# Patient Record
Sex: FEMALE | Race: WHITE | NOT HISPANIC OR LATINO | Employment: UNEMPLOYED | URBAN - METROPOLITAN AREA
[De-identification: names, ages, dates, MRNs, and addresses within clinical notes are randomized per-mention and may not be internally consistent; named-entity substitution may affect disease eponyms.]

---
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
## 2022-02-10 ENCOUNTER — OFFICE VISIT (OUTPATIENT)
Dept: URGENT CARE | Facility: URGENT CARE | Age: 22
End: 2022-02-10
Payer: COMMERCIAL

## 2022-02-10 VITALS
BODY MASS INDEX: 13.7 KG/M2 | HEART RATE: 87 BPM | TEMPERATURE: 97.6 F | WEIGHT: 86.2 LBS | SYSTOLIC BLOOD PRESSURE: 102 MMHG | OXYGEN SATURATION: 99 % | DIASTOLIC BLOOD PRESSURE: 71 MMHG

## 2022-02-10 DIAGNOSIS — S10.93XA CONTUSION OF FACE, SCALP AND NECK, INITIAL ENCOUNTER: Primary | ICD-10-CM

## 2022-02-10 DIAGNOSIS — S09.90XA CLOSED HEAD INJURY, INITIAL ENCOUNTER: ICD-10-CM

## 2022-02-10 DIAGNOSIS — S00.83XA CONTUSION OF FACE, SCALP AND NECK, INITIAL ENCOUNTER: Primary | ICD-10-CM

## 2022-02-10 DIAGNOSIS — S00.03XA CONTUSION OF FACE, SCALP AND NECK, INITIAL ENCOUNTER: Primary | ICD-10-CM

## 2022-02-10 PROCEDURE — 99213 OFFICE O/P EST LOW 20 MIN: CPT | Performed by: NURSE PRACTITIONER

## 2022-02-10 ASSESSMENT — ENCOUNTER SYMPTOMS
CARDIOVASCULAR NEGATIVE: 1
FALLS: 1
BACK PAIN: 0
EYES NEGATIVE: 1
RESPIRATORY NEGATIVE: 1
NAUSEA: 0
HEADACHES: 1
VOMITING: 0
CONSTITUTIONAL NEGATIVE: 1
NECK PAIN: 0

## 2022-02-10 NOTE — PROGRESS NOTES
HPI  Patient is a 22-year-old female who presents to the urgent care after falling in her driveway.  She reports she was running out of her car to pick something up and she slipped on the ice.  She fell striking the left side of her face on the concrete.  She reports a small superficial laceration on the left side of the chin just inferior to her lip as well as bruising on the face.  She is unsure if she had a loss of consciousness but states she was quite dazed after the accident.  She denies any nausea, blurred vision, or vomiting.  She is able to ambulate into the exam room on her own and is accompanied by her mother.    Review of Systems   Constitutional: Negative.    HENT:        Facial contusion, negative dental injury   Eyes: Negative.    Respiratory: Negative.    Cardiovascular: Negative.    Gastrointestinal: Negative for nausea and vomiting.   Musculoskeletal: Positive for falls. Negative for back pain and neck pain.   Skin:        Superficial lac chin   Neurological: Positive for headaches.   Endo/Heme/Allergies: Negative.      No past medical history on file.  Patient Active Problem List   Diagnosis     Pectus excavatum     Underweight     Abdominal pain, chronic, generalized     Major depressive disorder, single episode, moderate (H)     Problem with child being bullied, initial encounter    No past surgical history on file.  Allergies   Allergen Reactions     Amoxicillin Shortness Of Breath and Rash     Current Outpatient Medications   Medication     Multiple Vitamin (MULTIVITAMIN OR)     norethindrone-ethinyl estradiol (JUNEL FE 1/20) 1-20 MG-MCG tablet     No current facility-administered medications for this visit.         Physical Exam  Vitals and nursing note reviewed.   Constitutional:       General: She is not in acute distress.     Comments: /71   Pulse 87   Temp 97.6  F (36.4  C)   Wt 39.1 kg (86 lb 3.2 oz)   SpO2 99%   BMI 13.70 kg/m       HENT:      Head:        Mouth/Throat:       Mouth: Mucous membranes are moist. No injury or oral lesions.      Dentition: Normal dentition.      Comments: Negative for any dental injury she does have a slight bit of bruising on the inner on the inside of the lower lip though the laceration does not extend through,  Cardiovascular:      Rate and Rhythm: Normal rate.   Pulmonary:      Effort: Pulmonary effort is normal.   Musculoskeletal:      Cervical back: Normal range of motion. No rigidity or tenderness.   Skin:     Capillary Refill: Capillary refill takes less than 2 seconds.   Neurological:      Mental Status: She is alert and oriented to person, place, and time.      Cranial Nerves: No cranial nerve deficit.      Sensory: Sensation is intact. No sensory deficit.      Motor: No weakness or pronator drift.      Coordination: Coordination normal. Finger-Nose-Finger Test normal.      Gait: Gait normal.   Psychiatric:         Mood and Affect: Mood normal.       Facial wound cleaned and single steri strip applied.    Assessment:  1. Contusion of face, scalp and neck, initial encounter    2. Closed head injury, initial encounter        Plan:  Icing bruised areas as directed  Tylenol 1-2 tabs po q4h prn pain  Mother given instructions on red flag and instructed that Kellee is not to be unsupervised for 24 hours including waking q 2 hours per instructions in AVS  Instructions regarding self-care of patient reviewed.   Written instructions provided in after visit summary and reviewed.  Patient instructed to see primary care provider for new or persistent symptoms.   Red flag symptoms reviewed and patient has been instructed to seek emergent care    Assessment:  1. Contusion of face, scalp and neck, initial encounter    2. Closed head injury, initial encounter        Plan:  Tylenol 1-2 tabs po q4h prn pain  Instructions regarding self-care of patient/child reviewed.   Written instructions provided in after visit summary and reviewed.  Patient instructed to see  primary care provider for new or persistent symptoms.   Red flag symptoms reviewed and patient has been instructed to seek emergent care    Michelle Rodríguez, DNP, APRN, CNP

## 2022-02-10 NOTE — PATIENT INSTRUCTIONS
Patient Education     Bruises (Contusions)    A bruise (contusion) happens when a blow to your body doesn't break the skin but does break blood vessels beneath the skin. Blood leaking from the broken vessels causes redness and swelling. As it heals, your bruise is likely to turn colors like purple, green, and yellow. This is normal. The bruise should fade in 2 or 3 weeks.   Things that make you more likely to bruise   Almost everyone bruises now and then. Certain people do bruise more easily than others. You're more prone to bruising as you get older. That's because blood vessels become more fragile with age. You're also more likely to bruise if you have a clotting disorder such as hemophilia or take medicines that reduce clotting, including aspirin, nonsteriodal anti-inflammatory (NSAIDs) and blood-thinning medicines. You are also more likely to bruise if you have liver disease or drink alcohol daily.   When to go to the emergency room (ER)  Bruises almost always heal on their own without special treatment. But for some people, a bad bruise can be serious. Seek medical care if you:     Have a clotting disorder such as hemophilia    Have cirrhosis or other serious liver disease    Take blood-thinning medicines such as heparin or warfarin  What to expect in the ER  A doctor will examine your bruise and ask about any health conditions you have. In some cases, you may have a test to check how well your blood clots. Other treatment will depend on your needs.   Follow-up care  Sometimes a bruise gets worse instead of better. It may become larger and more swollen. This can occur when your body walls off a small pool of blood under the skin (hematoma). In very rare cases, your doctor may need to drain extra blood from the area.   Tip:  Apply an ice pack or bag of frozen peas to a bruise for 15 to 20 minutes several times a day. Keep a thin cloth between the ice or frozen peas and your skin. The cold can help reduce  redness and swelling.   [employer] last reviewed this educational content on 10/1/2019    7322-1295 [employer]. All rights reserved. This information is not intended as a substitute for professional medical care. Always follow your healthcare professional's instructions.           Patient Education     Head Injury with Sleep Monitoring (Adult)    You have a head injury. It does not appear serious at this time. But symptoms of a more serious problem, such as mild brain injury (concussion), or bruising or bleeding in the brain, may appear later. For this reason, you and someone caring for you will need to watch for the symptoms listed below. Once at home, also be sure to follow any care instructions you re given.  Home care  Watch for the following symptoms  Someone must stay with you for the next 24 hours (or longer, if directed). If you fall asleep, this person should wake you up every 2 hours, or as directed, to check your symptoms. This is called sleep monitoring. Symptoms to watch for include:    Headache    Nausea or vomiting    Dizziness    Sensitivity to light or noise    Unusual sleepiness or grogginess    Trouble falling asleep    Personality changes    Vision changes    Memory loss    Confusion    Trouble walking or clumsiness    Loss of consciousness (even for a short time)    Inability to be awakened    Stiff neck    Weakness or numbness in any part of the body    Seizures    Bruising behind the ears or around the eyes  If you develop any of these symptoms, seek emergency medical care right away. If none of these symptoms are noted during the first 24 hours, keep watching for symptoms for the next day or so. Ask your provider if someone should stay with you during this time.   General care    If you were prescribed medicines for pain, use them as directed. Don t use other pain medicines without checking with your provider first.    To help reduce swelling and pain, apply a cold source to the  injured area for up to 20 minutes at a time. Do this as often as directed. Use a cold pack or bag of ice wrapped in a thin towel. Never apply a cold source directly to the skin.    If you have cuts or scrapes as a result of your injury, care for them as directed.    For the next 24 hours (or longer, if instructed):  ? Don t drink alcohol or use sedatives or other medicines that make you sleepy.  ? Don t drive or operate machinery.  ? Don t do anything strenuous, such as heavy lifting or straining.  ? Limit tasks that require concentration. This includes reading, using a smartphone or computer, watching TV, and playing video games.  ? Don t return to sports or other activity that could result in another head injury.  Follow-up care  Follow up with your healthcare provider, or as directed. If imaging tests were done, they will be reviewed by a doctor. You will be told the results and any new findings that may affect your care.  When to seek medical advice  Call your healthcare provider right away if any of these occur:    Pain doesn t get better or worsens    New or increased swelling or bruising    Fever of 100.4 F (38 C) or higher, or as directed by your provider    Redness, warmth, bleeding, or drainage from the injured area    Any depression or bony abnormality in the injured area    Fluid drainage or bleeding from the nose or ears    Bruising behind the ears or around the eyes    Lethargy or excessive sleepiness  [employer] last reviewed this educational content on 4/1/2018 2000-2021 [employer]. All rights reserved. This information is not intended as a substitute for professional medical care. Always follow your healthcare professional's instructions.

## 2022-06-06 ENCOUNTER — MYC MEDICAL ADVICE (OUTPATIENT)
Dept: FAMILY MEDICINE | Facility: CLINIC | Age: 22
End: 2022-06-06

## 2022-06-06 ENCOUNTER — VIRTUAL VISIT (OUTPATIENT)
Dept: FAMILY MEDICINE | Facility: CLINIC | Age: 22
End: 2022-06-06
Payer: COMMERCIAL

## 2022-06-06 DIAGNOSIS — R06.02 SHORTNESS OF BREATH: Primary | ICD-10-CM

## 2022-06-06 DIAGNOSIS — R13.10 DYSPHAGIA, UNSPECIFIED TYPE: ICD-10-CM

## 2022-06-06 DIAGNOSIS — F32.1 MAJOR DEPRESSIVE DISORDER, SINGLE EPISODE, MODERATE (H): ICD-10-CM

## 2022-06-06 DIAGNOSIS — Q67.6 PECTUS EXCAVATUM: ICD-10-CM

## 2022-06-06 PROCEDURE — 99214 OFFICE O/P EST MOD 30 MIN: CPT | Mod: GT | Performed by: FAMILY MEDICINE

## 2022-06-06 NOTE — PATIENT INSTRUCTIONS
At [facility], we strive to deliver an exceptional experience to you, every time we see you. If you receive a survey, please complete it as we do value your feedback.  If you have MyChart, you can expect to receive results automatically within 24 hours of their completion.  Your provider will send a note interpreting your results as well.   If you do not have MyChart, you should receive your results in about a week by mail.    Your care team:                            Family Medicine Internal Medicine   MD Dm Edwards MD Shantel Branch-Fleming, MD Srinivasa Vaka, MD Katya Belousova, MARISSA Siegel CNP, MD ([facility]   Eladio Betts, MD Sydnee Abbott MD Amelia Massimini APRN CNP Kim Thein, APRN CNP Bethany Templen, MD             Clinic hours: Monday - Thursday 7 am-6 pm; Fridays 7 am-5 pm.   Urgent care: Monday - Friday 10 am- 8 pm; Saturday and Sunday 9 am-5 pm.    Clinic: [phone]       [facility]: Monday - Thursday 8 am - 7 pm; Friday 8 am - 6 pm  [facility]: [phone]

## 2022-06-06 NOTE — TELEPHONE ENCOUNTER
See MyC message below, unclear of what form patient is referring to. PHQ-9 questionnaire?  Routing to provider and care team to review and clarify, thank you.      Rosy Wang RN  [facility]

## 2022-06-06 NOTE — PROGRESS NOTES
Kellee is a 22 year old who is being evaluated via a billable video visit.      How would you like to obtain your AVS? MyChart  If the video visit is dropped, the invitation should be resent by: Send to e-mail at: [email or web address]  Will anyone else be joining your video visit? No    Video Start Time: 9:35 AM    Assessment & Plan     Shortness of breath  Uncertain etiology but worry for nutrition deficiencies.  Check CT and EGD to evaluation Pectus Excavatum and swallow issues.   - CT Chest w/o Contrast; Future  - Adult Gastro Ref - Procedure Only; Future    Pectus excavatum  Identified on Chest xray, offered muscle relaxer to see if it would help with discomfort but patient declined.  - Adult Gastro Ref - Procedure Only; Future    Dysphagia, unspecified type  As above.  - Adult Gastro Ref - Procedure Only; Future    Major depression  Likely affecting self-image and maybe contributing to feeding issues.    Return in about 2 weeks (around 6/20/2022), or if symptoms worsen or fail to improve.    Kelsey Hunter MD  [facility]   Kellee is a 22 year old who presents for the following health issues     HPI     Concerns: Following up on Pectus Excavatum  Would like to discuss potential surgery. She feels like she can't eat or gain weight.  She feels like her food is getting stuck.  She has seen a nutritionist in the past.  She states that she also has discomfort and has popping and trouble lying down.      Review of Systems   Constitutional, HEENT, cardiovascular, pulmonary, gi and gu systems are negative, except as otherwise noted.      Objective           Vitals:  No vitals were obtained today due to virtual visit.    Physical Exam   GENERAL: Healthy, alert and no distress  EYES: Eyes grossly normal to inspection.  No discharge or erythema, or obvious scleral/conjunctival abnormalities.  RESP: No audible wheeze, cough, or visible cyanosis.  No visible  retractions or increased work of breathing.    SKIN: Visible skin clear. No significant rash, abnormal pigmentation or lesions.  NEURO: Cranial nerves grossly intact.  Mentation and speech appropriate for age.  PSYCH: mentation appears normal and affect flat          Video-Visit Details    Type of service:  Video Visit    Video End Time:9:55 AM    Originating Location (pt. Location): Home    Distant Location (provider location):  [facility]     Platform used for Video Visit: [employer]ner[employer]

## 2022-06-07 ENCOUNTER — ANCILLARY PROCEDURE (OUTPATIENT)
Dept: CT IMAGING | Facility: CLINIC | Age: 22
End: 2022-06-07
Attending: FAMILY MEDICINE
Payer: COMMERCIAL

## 2022-06-07 DIAGNOSIS — R06.02 SHORTNESS OF BREATH: ICD-10-CM

## 2022-06-07 PROCEDURE — 71250 CT THORAX DX C-: CPT | Mod: GC | Performed by: RADIOLOGY

## 2022-06-14 ENCOUNTER — TELEPHONE (OUTPATIENT)
Dept: GASTROENTEROLOGY | Facility: CLINIC | Age: 22
End: 2022-06-14
Payer: COMMERCIAL

## 2022-06-14 NOTE — TELEPHONE ENCOUNTER
Screening Questions  BlueKIND OF PREP RedLOCATION [review exclusion criteria] GreenSEDATION TYPE  1. Are you active on mychart? Y    2. Ordering/Referring Provider: Kelsey Giordano MD    3. What insurance is in the chart? [employer]     4. Do you have a legal guardian or medical Power of ?  Are you able to give consent for your medical care? N   (Sedation review/consideration needed)    3.   BMI 13.9 [BMI OVER 40-EXTENDED PREP]  If greater than 40 review exclusion criteria [PAC APPT IF @ [facility]]      4. Have you had a positive covid test in the last 90 days? N     5.  Respiratory Screening :  [If yes to any of the following HOSPITAL setting only]     Do you use daily home oxygen? N  Do you have mod to severe Obstructive Sleep Apnea? N [OKAY @ [facility] SH PH RI]   Do you have Pulmonary Hypertension? N   Do you have UNCONTROLLED asthma? N      6.   Have you had a heart or lung transplant? N      7.   Are you currently on dialysis? N [ If yes, G-PREP & HOSPITAL setting only]     8.   Do you have chronic kidney disease? N[ If yes, G-PREP ]    9.   Have you had a stroke or Transient ischemic attack (TIA - aka  mini stroke ) within 6 months?  N(If yes, please review exclusion criteria)    10.   In the past 6 months, have you had any heart related issues including cardiomyopathy or heart attack? N          If yes, did it require cardiac stenting or other implantable device? N      11.   Do you have any implantable devices in your body (pacemaker, defib, LVAD)? N (If yes, please review exclusion criteria)    12.   Do you take nitroglycerin? N           If yes, how often? N  (if yes, HOSPITAL setting ONLY)    13.   Are you currently taking any blood thinners? N           [IF YES, INFORM PATIENT TO FOLLOW UP W/ ORDERING PROVIDER FOR BRIDGING INSTRUCTIONS]     14.   Do you have a diagnosis of diabetes? N [ If yes, G-PREP ]    15.   [FEMALES] Are you currently pregnant? N    If yes, how many  weeks? N    16.   Are you taking any prescription pain medications on a routine schedule?  N [ If yes, EXTENDED PREP.] [If yes, MAC]    17.   Do you have any chemical dependencies such as alcohol, street drugs, or methadone?  N [If yes, MAC]    18.   Do you have any history of post-traumatic stress syndrome, severe anxiety or history of psychosis?  N [If yes, MAC]    19.   Do you transfer independently?  Y    20.  On a regular basis do you go 3-5 days between bowel movements? N [ If yes, EXTENDED PREP.]    21.   Preferred LOCAL Pharmacy for Pre Prescription      [id number] [place] [facility] [address] [facility] [address] [facility]  [address]      Scheduling Details      Caller :  Kellee   (Please ask for phone number if not scheduled by patient)    Type of Procedure Scheduled: Upper Endoscopy [EGD]  Which Colonoscopy Prep was Sent?:     Surgeon: FELIBERTO  Date of Procedure: 07/19 105PM  Location:     Sedation Type: CS    Conscious Sedation- Needs  for 6 hours after the procedure  MAC/General-Needs  for 24 hours after procedure    Pre-op Required at [facility], [place], [place] and OR for MAC sedation: N  (advise patient they will need a pre-op prior to procedure -)      Informed patient they will need an adult  Y  Cannot take any type of public or medical transportation alone    Pre-Procedure Covid test to be completed at [facility] or Externally: HOME    Confirmed Nurse will call to complete assessment Y    Additional comments:

## 2022-06-20 NOTE — RESULT ENCOUNTER NOTE
MsMyrna Turk,    I have placed a thoracic surgery referral.  You should get a call to schedule.    Please contact the clinic if you have additional questions.  Thank you.    Sincerely,    Kelsey Hunter MD

## 2022-06-22 ENCOUNTER — TELEPHONE (OUTPATIENT)
Dept: SURGERY | Facility: CLINIC | Age: 22
End: 2022-06-22

## 2022-06-22 NOTE — TELEPHONE ENCOUNTER
Called patient to let her know I have a message out to Dr. Wood. He sees patients that are 21 yrs of age and older for pectus excavatum. He is currently out this week but is return next week. Will call patient with Dr. Wood's recommendation. Left my call back information.

## 2022-06-26 ENCOUNTER — HEALTH MAINTENANCE LETTER (OUTPATIENT)
Age: 22
End: 2022-06-26

## 2022-07-01 DIAGNOSIS — Q67.7 PECTUS CARINATUM: Primary | ICD-10-CM

## 2022-07-01 DIAGNOSIS — Q67.6 PECTUS EXCAVATUM: ICD-10-CM

## 2022-07-12 NOTE — TELEPHONE ENCOUNTER
Action July 12, 2022 8:15 AM [facility]   Action Taken Called and obtained verbal auth to update CE records       RECORDS STATUS - ALL OTHER DIAGNOSIS      RECORDS RECEIVED FROM: EPIC   DATE RECEIVED: 07/12/22   NOTES STATUS DETAILS   OFFICE NOTE from referring provider EPIC 06/06/22: Dr. Kelsey Fitzpatrick [facility]   OPERATIVE REPORT EPIC 07/13/22: Echo Stress test   MEDICATION LIST [facility]    LABS     ANYTHING RELATED TO DIAGNOSIS Epic Most recent 04/15/21   IMAGING (NEED IMAGES & REPORT)     CT SCANS PACS 06/07/22: CT Chest   XRAYS PACS 02/21/19-09/23/11: XR Chest

## 2022-07-13 ENCOUNTER — HOSPITAL ENCOUNTER (OUTPATIENT)
Dept: CARDIOLOGY | Facility: CLINIC | Age: 22
Discharge: HOME | End: 2022-07-13
Attending: CLINICAL NURSE SPECIALIST
Payer: COMMERCIAL

## 2022-07-13 ENCOUNTER — OFFICE VISIT (OUTPATIENT)
Dept: SURGERY | Facility: CLINIC | Age: 22
End: 2022-07-13
Attending: FAMILY MEDICINE
Payer: COMMERCIAL

## 2022-07-13 ENCOUNTER — PRE VISIT (OUTPATIENT)
Dept: SURGERY | Facility: CLINIC | Age: 22
End: 2022-07-13

## 2022-07-13 ENCOUNTER — LAB (OUTPATIENT)
Dept: LAB | Facility: CLINIC | Age: 22
End: 2022-07-13

## 2022-07-13 VITALS
DIASTOLIC BLOOD PRESSURE: 67 MMHG | OXYGEN SATURATION: 97 % | WEIGHT: 88.3 LBS | SYSTOLIC BLOOD PRESSURE: 100 MMHG | TEMPERATURE: 98.4 F | BODY MASS INDEX: 14.19 KG/M2 | HEART RATE: 85 BPM | RESPIRATION RATE: 18 BRPM | HEIGHT: 66 IN

## 2022-07-13 DIAGNOSIS — Q67.6 PECTUS EXCAVATUM: ICD-10-CM

## 2022-07-13 DIAGNOSIS — Z11.59 NEED FOR HEPATITIS C SCREENING TEST: ICD-10-CM

## 2022-07-13 DIAGNOSIS — R06.02 SHORTNESS OF BREATH: ICD-10-CM

## 2022-07-13 DIAGNOSIS — Z11.4 SCREENING FOR HIV (HUMAN IMMUNODEFICIENCY VIRUS): ICD-10-CM

## 2022-07-13 LAB
ALBUMIN SERPL-MCNC: 4.2 G/DL (ref 3.4–5)
PREALB SERPL IA-MCNC: 25 MG/DL (ref 15–45)

## 2022-07-13 PROCEDURE — 93350 STRESS TTE ONLY: CPT | Mod: 26 | Performed by: STUDENT IN AN ORGANIZED HEALTH CARE EDUCATION/TRAINING PROGRAM

## 2022-07-13 PROCEDURE — 93016 CV STRESS TEST SUPVJ ONLY: CPT | Performed by: STUDENT IN AN ORGANIZED HEALTH CARE EDUCATION/TRAINING PROGRAM

## 2022-07-13 PROCEDURE — 99000 SPECIMEN HANDLING OFFICE-LAB: CPT | Performed by: PATHOLOGY

## 2022-07-13 PROCEDURE — 93321 DOPPLER ECHO F-UP/LMTD STD: CPT | Mod: 26 | Performed by: STUDENT IN AN ORGANIZED HEALTH CARE EDUCATION/TRAINING PROGRAM

## 2022-07-13 PROCEDURE — 87389 HIV-1 AG W/HIV-1&-2 AB AG IA: CPT | Mod: 90 | Performed by: PATHOLOGY

## 2022-07-13 PROCEDURE — G0463 HOSPITAL OUTPT CLINIC VISIT: HCPCS

## 2022-07-13 PROCEDURE — 36415 COLL VENOUS BLD VENIPUNCTURE: CPT | Performed by: PATHOLOGY

## 2022-07-13 PROCEDURE — 82040 ASSAY OF SERUM ALBUMIN: CPT | Performed by: PATHOLOGY

## 2022-07-13 PROCEDURE — 86803 HEPATITIS C AB TEST: CPT | Mod: 90 | Performed by: PATHOLOGY

## 2022-07-13 PROCEDURE — 84134 ASSAY OF PREALBUMIN: CPT | Performed by: PATHOLOGY

## 2022-07-13 PROCEDURE — G0463 HOSPITAL OUTPT CLINIC VISIT: HCPCS | Mod: 25

## 2022-07-13 PROCEDURE — 93018 CV STRESS TEST I&R ONLY: CPT | Performed by: STUDENT IN AN ORGANIZED HEALTH CARE EDUCATION/TRAINING PROGRAM

## 2022-07-13 PROCEDURE — 93325 DOPPLER ECHO COLOR FLOW MAPG: CPT | Mod: 26 | Performed by: STUDENT IN AN ORGANIZED HEALTH CARE EDUCATION/TRAINING PROGRAM

## 2022-07-13 PROCEDURE — 99203 OFFICE O/P NEW LOW 30 MIN: CPT | Performed by: THORACIC SURGERY (CARDIOTHORACIC VASCULAR SURGERY)

## 2022-07-13 PROCEDURE — 93325 DOPPLER ECHO COLOR FLOW MAPG: CPT | Mod: TC

## 2022-07-13 ASSESSMENT — PAIN SCALES - GENERAL: PAINLEVEL: NO PAIN (0)

## 2022-07-13 NOTE — LETTER
"    7/13/2022         RE: Kellee Turk  [id number] [address] [id number]        Dear Colleague,    Thank you for referring your patient, Kellee Turk, to the [facility]. Please see a copy of my visit note below.    THORACIC SURGERY - NEW PATIENT OFFICE VISIT      Dear Dr. Fitzpatrick,    I saw Kellee Turk in consultation for the evaluation and treatment of pectus excavatum.    HPI  Ms. Kellee Turk is a 22 year old female patient who presented with exercise intolerance and occasional chest pain. She likes swimming and horse back riding, and she has noticed that she can't keep up with her peers. She also has dyspnea and palpitations with physical activity. She was referred by her PCP for surgical consultation.                        Previsit Tests   Covid vaccination status: Vaccinated  CT scan (6/7/22): severe pectus excavatum deformity with Amado index of 7.1, right sternal torsion, and mild compression of the right atrium         PMH  None    PSH  None     Allergies   Allergen Reactions     Amoxicillin Shortness Of Breath and Rash       Current Outpatient Medications   Medication     Multiple Vitamin (MULTIVITAMIN OR)     No current facility-administered medications for this visit.       ETOH: None  TOBACCO: Occasional     Physical examination  /67 (BP Location: Right arm, Patient Position: Sitting, Cuff Size: Child)   Pulse 85   Temp 98.4  F (36.9  C) (Oral)   Resp 18   Ht 1.675 m (5' 5.95\")   Wt 40.1 kg (88 lb 4.8 oz)   LMP 06/22/2022 (Approximate)   SpO2 97%   BMI 14.28 kg/m    Alert and oriented NAD.   Bilateral breath sounds.   Chest: Downward deformity of the sternum, no tilt. Moderate costal flare. No chest scars.      From a personal perspective, she comes to clinic with her mom. Her name comes from [employer]. She is in school and is going to graduate soon from [employer]. She is also planning a trip to [place] in January. "     IMPRESSION   22 year old female patient with severe pectus excavatum.    PLAN  I spent 30 min on the date of the encounter in chart review, patient visit, review of tests, documentation and/or discussion with other providers about the issues documented above. I reviewed the plan as follows:  Procedure planned: Modified Ravitch repair with sternal plating. She would like to schedule surgery in late January.   Necessary Preop Tests & Appointments: Return to clinic in early January. H&P by PCP. Insurance approval.   Regional Anesthesia Plan: Intraoperative intercostal cryoablation.  Anticoagulation Plan: Enoxaparin postop.   I had an extensive discussion with the patient regarding the rationale for surgery, the alternatives risks and benefits of the procedure. I explained the expected hospital stay, postoperative course, recovery time, diet and activity restrictions. Informed consent was obtained and they agreed to proceed.  I appreciate the opportunity to participate in the care of your patient and will keep you updated.      Again, thank you for allowing me to participate in the care of your patient.      Sincerely,    Fernando Wood MD

## 2022-07-13 NOTE — NURSING NOTE
"Oncology Rooming Note    July 13, 2022 2:46 PM   Kellee Turk is a 22 year old female who presents for:    Chief Complaint   Patient presents with     Oncology Clinic Visit     New patient: Pectus excavatum, shortness of breath      Initial Vitals: /67 (BP Location: Right arm, Patient Position: Sitting, Cuff Size: Child)   Pulse 85   Temp 98.4  F (36.9  C) (Oral)   Resp 18   Ht 1.675 m (5' 5.95\")   Wt 40.1 kg (88 lb 4.8 oz)   LMP 06/22/2022 (Approximate)   SpO2 97%   BMI 14.28 kg/m   Estimated body mass index is 14.28 kg/m  as calculated from the following:    Height as of this encounter: 1.675 m (5' 5.95\").    Weight as of this encounter: 40.1 kg (88 lb 4.8 oz). Body surface area is 1.37 meters squared.  No Pain (0) Comment: Data Unavailable   Patient's last menstrual period was 06/22/2022 (approximate).  Allergies reviewed: Yes  Medications reviewed: Yes    Medications: Medication refills not needed today.  Pharmacy name entered into [email or web address]:    [id number] [place] [place] [facility] [address] [id number] [place], [place] - [address] AT [facility]  [address]    Clinical concerns: New patent-reports SOB with exertion.        Quin Parikh LPN July 13, 2022 2:46 PM              "

## 2022-07-14 LAB
HCV AB SERPL QL IA: NONREACTIVE
HIV 1+2 AB+HIV1 P24 AG SERPL QL IA: NONREACTIVE

## 2022-07-14 NOTE — RESULT ENCOUNTER NOTE
Ms. Turk,    Your hepatitis C and HIV test is normal.    Please contact the clinic if you have additional questions.  Thank you.    Sincerely,    Kelsey Hunter MD

## 2022-07-15 RX ORDER — NALOXONE HYDROCHLORIDE 0.4 MG/ML
0.4 INJECTION, SOLUTION INTRAMUSCULAR; INTRAVENOUS; SUBCUTANEOUS
Status: CANCELLED | OUTPATIENT
Start: 2022-07-15

## 2022-07-15 RX ORDER — FLUMAZENIL 0.1 MG/ML
0.2 INJECTION, SOLUTION INTRAVENOUS
Status: CANCELLED | OUTPATIENT
Start: 2022-07-15 | End: 2022-07-15

## 2022-07-15 RX ORDER — ONDANSETRON 2 MG/ML
4 INJECTION INTRAMUSCULAR; INTRAVENOUS EVERY 6 HOURS PRN
Status: CANCELLED | OUTPATIENT
Start: 2022-07-15

## 2022-07-15 RX ORDER — NALOXONE HYDROCHLORIDE 0.4 MG/ML
0.2 INJECTION, SOLUTION INTRAMUSCULAR; INTRAVENOUS; SUBCUTANEOUS
Status: CANCELLED | OUTPATIENT
Start: 2022-07-15

## 2022-07-15 RX ORDER — ONDANSETRON 4 MG/1
4 TABLET, ORALLY DISINTEGRATING ORAL EVERY 6 HOURS PRN
Status: CANCELLED | OUTPATIENT
Start: 2022-07-15

## 2022-07-15 RX ORDER — PROCHLORPERAZINE MALEATE 10 MG
10 TABLET ORAL EVERY 6 HOURS PRN
Status: CANCELLED | OUTPATIENT
Start: 2022-07-15

## 2022-07-19 ENCOUNTER — HOSPITAL ENCOUNTER (OUTPATIENT)
Facility: AMBULATORY SURGERY CENTER | Age: 22
Discharge: HOME | End: 2022-07-19
Attending: INTERNAL MEDICINE
Payer: COMMERCIAL

## 2022-07-19 RX ORDER — LIDOCAINE 40 MG/G
CREAM TOPICAL
Status: DISCONTINUED | OUTPATIENT
Start: 2022-07-19 | End: 2022-10-20 | Stop reason: HOSPADM

## 2022-07-19 RX ORDER — ONDANSETRON 2 MG/ML
4 INJECTION INTRAMUSCULAR; INTRAVENOUS
Status: DISCONTINUED | OUTPATIENT
Start: 2022-07-19 | End: 2022-10-20 | Stop reason: HOSPADM

## 2022-07-19 NOTE — PROGRESS NOTES
Pt ate grilled cheese this morning at 8am. Pt drank water up until her appointment. Case cancelled for safety. EGD instructions printed for patient. GI scheduling number given to reschedule.

## 2022-07-28 ENCOUNTER — PREP FOR PROCEDURE (OUTPATIENT)
Dept: SURGERY | Facility: CLINIC | Age: 22
End: 2022-07-28

## 2022-07-28 DIAGNOSIS — Q67.6 PECTUS EXCAVATUM: Primary | ICD-10-CM

## 2022-07-28 RX ORDER — ACETAMINOPHEN 325 MG/1
975 TABLET ORAL ONCE
Status: CANCELLED | OUTPATIENT
Start: 2022-07-28 | End: 2022-07-28

## 2022-09-09 NOTE — PATIENT INSTRUCTIONS
Azithromycin 2 tablets today then 1 tablet daily for 4 more days   Mucinex DM 1 tablet twice a day for 10 days   Ibuprofen 600 mg every 6 hours as needed for headache pain   Follow up if not better after the antibiotic course.      [Negative] : Heme/Lymph

## 2022-12-26 ENCOUNTER — HEALTH MAINTENANCE LETTER (OUTPATIENT)
Age: 22
End: 2022-12-26

## 2023-01-25 ENCOUNTER — TELEPHONE (OUTPATIENT)
Dept: SURGERY | Facility: CLINIC | Age: 23
End: 2023-01-25
Payer: COMMERCIAL

## 2023-01-25 NOTE — TELEPHONE ENCOUNTER
Spoke with patient to schedule procedure with Dr. Wood   Procedure was scheduled on 02/06/23 at [facility] OR  Patient will have H&P with TBD    Patient is aware a COVID-19 test is needed before their procedure.   (Patient is aware IP/Thoracic are still requiring COVID-19 test)     Patient will complete a PCR COVID-19 test due to inpatient status, patient will schedule at:     Patient is aware a / is needed day of surgery.   Surgery Letter was sent via [employer],     Patient has my direct contact information for any further questions.   Called and spoke with pt's mother who stated they are on vacation in [place] right now and asked for a call back on Monday. Informed pt's mom Johanny that surgeyr date is set for 2/6 IF that date works for them. She agreed, stating that would work. Writer informed johanny date is set and we would reach back on next week to go over the details.    Veronica Stewart on 1/25/2023 at 10:36 AM

## 2023-01-31 NOTE — TELEPHONE ENCOUNTER
Called pt's mother Johanny to inform that Dr. Wood will need to see pt prior to surgery. Got no answer, left detailed vcm stating surgery cancelled for now and scheduled clinic visit with [place] for 02/15/23. Also sent my chart msg.

## 2023-02-21 ENCOUNTER — PREP FOR PROCEDURE (OUTPATIENT)
Dept: SURGERY | Facility: CLINIC | Age: 23
End: 2023-02-21
Payer: COMMERCIAL

## 2023-02-21 NOTE — PROGRESS NOTES
"THORACIC SURGERY FOLLOW UP VISIT      I saw Ms. Kellee Turk in follow-up today. The clinical summary follows:     DIAGNOSIS   Severe pectus excavatum.  INTERVAL STUDIES  Albumin 4.2. Prealbumin 25.     Previsit Tests   Covid vaccination status: Vaccinated  CT scan (6/7/22): severe pectus excavatum deformity with Amado index of 7.1, right sternal torsion, and mild compression of the right atrium         Echo 7/1/22:  Average functional capacity, however her MVO2 was only 85%.     PMH  No past medical history on file.     PSH  No past surgical history on file.     Allergies   Allergen Reactions     Amoxicillin Shortness Of Breath and Rash     Current Outpatient Medications   Medication     Multiple Vitamin (MULTIVITAMIN OR)     No current facility-administered medications for this visit.     Social History     Tobacco Use     Smoking status: Never     Smokeless tobacco: Never   Vaping Use     Vaping Use: Never used   Substance Use Topics     Alcohol use: No     Alcohol/week: 0.0 standard drinks     Drug use: No       Physical examination  /88   Pulse 89   Temp 97.5  F (36.4  C) (Oral)   Resp 16   Ht 1.686 m (5' 6.38\")   Wt 40.6 kg (89 lb 8 oz)   SpO2 100%   BMI 14.28 kg/m          TOMA Goodson comes to clinic today for a follow up and preop visit. I saw her last year but she wanted to wait until she had a break from school. Her symptoms are exercise intolerance and occasional chest pain, and quite debilitating.     From a personal perspective, she comes to clinic with her family. Her name comes from [facility] culture. She is finishing [employer] school. They went on a trip to [place] in January.     IMPRESSION   23 year old female patient with severe pectus excavatum.    PLAN  I spent 30 min on the date of the encounter in chart review, patient visit, review of tests, documentation and/or discussion with other providers about the issues documented above. I reviewed the plan as " follows:  Procedure planned: Modified Ravitch repair with sternal plating.   Necessary Preop Tests & Appointments: H&P by PCP. Insurance approval.   Regional Anesthesia Plan: Intraoperative intercostal cryoablation.  Anticoagulation Plan: Enoxaparin postop.   I had an extensive discussion with the patient regarding the rationale for surgery, the alternatives risks and benefits of the procedure. I explained the expected hospital stay, postoperative course, recovery time, diet and activity restrictions. Informed consent was obtained and they agreed to proceed.  All questions were answered and the patient and present family were in agreement with the plan.  I appreciate the opportunity to participate in the care of your patient and will keep you updated.  Sincerely,    Bay Obrien MD

## 2023-02-22 ENCOUNTER — ONCOLOGY VISIT (OUTPATIENT)
Dept: SURGERY | Facility: CLINIC | Age: 23
End: 2023-02-22
Attending: THORACIC SURGERY (CARDIOTHORACIC VASCULAR SURGERY)
Payer: COMMERCIAL

## 2023-02-22 VITALS
OXYGEN SATURATION: 100 % | RESPIRATION RATE: 16 BRPM | BODY MASS INDEX: 14.38 KG/M2 | DIASTOLIC BLOOD PRESSURE: 88 MMHG | HEART RATE: 89 BPM | WEIGHT: 89.5 LBS | SYSTOLIC BLOOD PRESSURE: 123 MMHG | TEMPERATURE: 97.5 F | HEIGHT: 66 IN

## 2023-02-22 DIAGNOSIS — Q67.6 PECTUS EXCAVATUM: Primary | ICD-10-CM

## 2023-02-22 PROCEDURE — 99214 OFFICE O/P EST MOD 30 MIN: CPT | Performed by: THORACIC SURGERY (CARDIOTHORACIC VASCULAR SURGERY)

## 2023-02-22 PROCEDURE — G0463 HOSPITAL OUTPT CLINIC VISIT: HCPCS | Performed by: THORACIC SURGERY (CARDIOTHORACIC VASCULAR SURGERY)

## 2023-02-22 ASSESSMENT — PAIN SCALES - GENERAL: PAINLEVEL: NO PAIN (0)

## 2023-02-22 NOTE — NURSING NOTE
"Oncology Rooming Note    February 22, 2023 9:03 AM   Kellee Turk is a 23 year old female who presents for:    Chief Complaint   Patient presents with     Oncology Clinic Visit     Pectus excavatum      Initial Vitals: /88   Pulse 89   Temp 97.5  F (36.4  C) (Oral)   Resp 16   Ht 1.686 m (5' 6.38\")   Wt 40.6 kg (89 lb 8 oz)   SpO2 100%   BMI 14.28 kg/m   Estimated body mass index is 14.28 kg/m  as calculated from the following:    Height as of this encounter: 1.686 m (5' 6.38\").    Weight as of this encounter: 40.6 kg (89 lb 8 oz). Body surface area is 1.38 meters squared.  No Pain (0) Comment: Data Unavailable   No LMP recorded.  Allergies reviewed: Yes  Medications reviewed: Yes    Medications: Medication refills not needed today.  Pharmacy name entered into [employer]:    [id number] [place], [place] - [address] [id number] [place], [place] - [address] AT [facility]  [address]    Clinical concerns: No new clinical concerns other than reason for visit today.     Daniela Robins, EMT            "

## 2023-02-22 NOTE — LETTER
"    2/22/2023         RE: Kellee Turk  [address] [address] [address]        Dear Colleague,    Thank you for referring your patient, Kellee Turk, to the [facility]. Please see a copy of my visit note below.    THORACIC SURGERY FOLLOW UP VISIT      I saw Ms. Kellee Turk in follow-up today. The clinical summary follows:     DIAGNOSIS   Severe pectus excavatum.  INTERVAL STUDIES  Albumin 4.2. Prealbumin 25.     Previsit Tests   Covid vaccination status: Vaccinated  CT scan (6/7/22): severe pectus excavatum deformity with Amado index of 7.1, right sternal torsion, and mild compression of the right atrium         Echo 7/1/22:  Average functional capacity, however her MVO2 was only 85%.     PMH  No past medical history on file.     PSH  No past surgical history on file.     Allergies   Allergen Reactions     Amoxicillin Shortness Of Breath and Rash     Current Outpatient Medications   Medication     Multiple Vitamin (MULTIVITAMIN OR)     No current facility-administered medications for this visit.     Social History     Tobacco Use     Smoking status: Never     Smokeless tobacco: Never   Vaping Use     Vaping Use: Never used   Substance Use Topics     Alcohol use: No     Alcohol/week: 0.0 standard drinks     Drug use: No       Physical examination  /88   Pulse 89   Temp 97.5  F (36.4  C) (Oral)   Resp 16   Ht 1.686 m (5' 6.38\")   Wt 40.6 kg (89 lb 8 oz)   SpO2 100%   BMI 14.28 kg/m          TOMA Goodson comes to clinic today for a follow up and preop visit. I saw her last year but she wanted to wait until she had a break from school. Her symptoms are exercise intolerance and occasional chest pain, and quite debilitating.     From a personal perspective, she comes to clinic with her family. Her name comes from [employer] culture. She is finishing [employer] school. They went on a trip to [place] in January.     IMPRESSION   23 year old " female patient with severe pectus excavatum.    PLAN  I spent 30 min on the date of the encounter in chart review, patient visit, review of tests, documentation and/or discussion with other providers about the issues documented above. I reviewed the plan as follows:  Procedure planned: Modified Ravitch repair with sternal plating.   Necessary Preop Tests & Appointments: H&P by PCP. Insurance approval.   Regional Anesthesia Plan: Intraoperative intercostal cryoablation.  Anticoagulation Plan: Enoxaparin postop.   I had an extensive discussion with the patient regarding the rationale for surgery, the alternatives risks and benefits of the procedure. I explained the expected hospital stay, postoperative course, recovery time, diet and activity restrictions. Informed consent was obtained and they agreed to proceed.  All questions were answered and the patient and present family were in agreement with the plan.  I appreciate the opportunity to participate in the care of your patient and will keep you updated.  Sincerely,    Bay Obrien MD

## 2023-03-27 ENCOUNTER — OFFICE VISIT (OUTPATIENT)
Dept: FAMILY MEDICINE | Facility: CLINIC | Age: 23
End: 2023-03-27
Payer: COMMERCIAL

## 2023-03-27 VITALS
RESPIRATION RATE: 13 BRPM | HEART RATE: 60 BPM | SYSTOLIC BLOOD PRESSURE: 122 MMHG | WEIGHT: 85.2 LBS | TEMPERATURE: 97.5 F | HEIGHT: 67 IN | DIASTOLIC BLOOD PRESSURE: 75 MMHG | OXYGEN SATURATION: 98 % | BODY MASS INDEX: 13.37 KG/M2

## 2023-03-27 DIAGNOSIS — Z11.3 SCREEN FOR STD (SEXUALLY TRANSMITTED DISEASE): ICD-10-CM

## 2023-03-27 DIAGNOSIS — Q67.6 PECTUS EXCAVATUM: ICD-10-CM

## 2023-03-27 DIAGNOSIS — Z23 NEED FOR PROPHYLACTIC VACCINATION AND INOCULATION AGAINST INFLUENZA: ICD-10-CM

## 2023-03-27 DIAGNOSIS — Z01.818 PREOP GENERAL PHYSICAL EXAM: Primary | ICD-10-CM

## 2023-03-27 LAB
BASOPHILS # BLD AUTO: 0 10E3/UL (ref 0–0.2)
BASOPHILS NFR BLD AUTO: 0 %
EOSINOPHIL # BLD AUTO: 0.1 10E3/UL (ref 0–0.7)
EOSINOPHIL NFR BLD AUTO: 2 %
ERYTHROCYTE [DISTWIDTH] IN BLOOD BY AUTOMATED COUNT: 11.5 % (ref 10–15)
HCG UR QL: NEGATIVE
HCT VFR BLD AUTO: 37.4 % (ref 35–47)
HGB BLD-MCNC: 13.3 G/DL (ref 11.7–15.7)
IMM GRANULOCYTES # BLD: 0 10E3/UL
IMM GRANULOCYTES NFR BLD: 0 %
INR PPP: 1.04 (ref 0.85–1.15)
LYMPHOCYTES # BLD AUTO: 1.6 10E3/UL (ref 0.8–5.3)
LYMPHOCYTES NFR BLD AUTO: 32 %
MCH RBC QN AUTO: 31.1 PG (ref 26.5–33)
MCHC RBC AUTO-ENTMCNC: 35.6 G/DL (ref 31.5–36.5)
MCV RBC AUTO: 87 FL (ref 78–100)
MONOCYTES # BLD AUTO: 0.4 10E3/UL (ref 0–1.3)
MONOCYTES NFR BLD AUTO: 8 %
NEUTROPHILS # BLD AUTO: 2.9 10E3/UL (ref 1.6–8.3)
NEUTROPHILS NFR BLD AUTO: 57 %
PLATELET # BLD AUTO: 173 10E3/UL (ref 150–450)
RBC # BLD AUTO: 4.28 10E6/UL (ref 3.8–5.2)
WBC # BLD AUTO: 5.1 10E3/UL (ref 4–11)

## 2023-03-27 PROCEDURE — 86780 TREPONEMA PALLIDUM: CPT | Performed by: FAMILY MEDICINE

## 2023-03-27 PROCEDURE — 85610 PROTHROMBIN TIME: CPT | Performed by: FAMILY MEDICINE

## 2023-03-27 PROCEDURE — 90686 IIV4 VACC NO PRSV 0.5 ML IM: CPT | Performed by: FAMILY MEDICINE

## 2023-03-27 PROCEDURE — 87389 HIV-1 AG W/HIV-1&-2 AB AG IA: CPT | Performed by: FAMILY MEDICINE

## 2023-03-27 PROCEDURE — 86803 HEPATITIS C AB TEST: CPT | Performed by: FAMILY MEDICINE

## 2023-03-27 PROCEDURE — 87491 CHLMYD TRACH DNA AMP PROBE: CPT | Performed by: FAMILY MEDICINE

## 2023-03-27 PROCEDURE — 99214 OFFICE O/P EST MOD 30 MIN: CPT | Mod: 25 | Performed by: FAMILY MEDICINE

## 2023-03-27 PROCEDURE — 81025 URINE PREGNANCY TEST: CPT | Performed by: FAMILY MEDICINE

## 2023-03-27 PROCEDURE — 36415 COLL VENOUS BLD VENIPUNCTURE: CPT | Performed by: FAMILY MEDICINE

## 2023-03-27 PROCEDURE — 91312 COVID-19 VACCINE BIVALENT BOOSTER 12+ (PFIZER): CPT | Performed by: FAMILY MEDICINE

## 2023-03-27 PROCEDURE — 0124A COVID-19 VACCINE BIVALENT BOOSTER 12+ (PFIZER): CPT | Performed by: FAMILY MEDICINE

## 2023-03-27 PROCEDURE — 87340 HEPATITIS B SURFACE AG IA: CPT | Performed by: FAMILY MEDICINE

## 2023-03-27 PROCEDURE — 85025 COMPLETE CBC W/AUTO DIFF WBC: CPT | Performed by: FAMILY MEDICINE

## 2023-03-27 PROCEDURE — 90471 IMMUNIZATION ADMIN: CPT | Performed by: FAMILY MEDICINE

## 2023-03-27 PROCEDURE — 87591 N.GONORRHOEAE DNA AMP PROB: CPT | Performed by: FAMILY MEDICINE

## 2023-03-27 ASSESSMENT — PATIENT HEALTH QUESTIONNAIRE - PHQ9
SUM OF ALL RESPONSES TO PHQ QUESTIONS 1-9: 3
10. IF YOU CHECKED OFF ANY PROBLEMS, HOW DIFFICULT HAVE THESE PROBLEMS MADE IT FOR YOU TO DO YOUR WORK, TAKE CARE OF THINGS AT HOME, OR GET ALONG WITH OTHER PEOPLE: NOT DIFFICULT AT ALL
SUM OF ALL RESPONSES TO PHQ QUESTIONS 1-9: 3

## 2023-03-27 NOTE — PATIENT INSTRUCTIONS
For informational purposes only. Not to replace the advice of your health care provider. Copyright   2003,  [facility]. All rights reserved. Clinically reviewed by Lois Vicente MD. [employer] [id number] - REV .  Preparing for Your Surgery  Getting started  A nurse will call you to review your health history and instructions. They will give you an arrival time based on your scheduled surgery time. Please be ready to share:    Your doctor's clinic name and phone number    Your medical, surgical, and anesthesia history    A list of allergies and sensitivities    A list of medicines, including herbal treatments and over-the-counter drugs    Whether the patient has a legal guardian (ask how to send us the papers in advance)  Please tell us if you're pregnant--or if there's any chance you might be pregnant. Some surgeries may injure a fetus (unborn baby), so they require a pregnancy test. Surgeries that are safe for a fetus don't always need a test, and you can choose whether to have one.   If you have a child who's having surgery, please ask for a copy of Preparing for Your Child's Surgery.    Preparing for surgery    Within 10 to 30 days of surgery: Have a pre-op exam (sometimes called an H&P, or History and Physical). This can be done at a clinic or pre-operative center.  ? If you're having a , you may not need this exam. Talk to your care team.    At your pre-op exam, talk to your care team about all medicines you take. If you need to stop any medicines before surgery, ask when to start taking them again.  ? We do this for your safety. Many medicines can make you bleed too much during surgery. Some change how well surgery (anesthesia) drugs work.    Call your insurance company to let them know you're having surgery. (If you don't have insurance, call [phone].)    Call your clinic if there's any change in your health. This includes signs of a cold or flu (sore throat, runny nose,  cough, rash, fever). It also includes a scrape or scratch near the surgery site.    If you have questions on the day of surgery, call your hospital or surgery center.  Eating and drinking guidelines  For your safety: Unless your surgeon tells you otherwise, follow the guidelines below.    Eat and drink as usual until 8 hours before you arrive for surgery. After that, no food or milk.    Drink clear liquids until 2 hours before you arrive. These are liquids you can see through, like water, Gatorade, and Propel Water. They also include plain black coffee and tea (no cream or milk), candy, and breath mints. You can spit out gum when you arrive.    If you drink alcohol: Stop drinking it the night before surgery.    If your care team tells you to take medicine on the morning of surgery, it's okay to take it with a sip of water.  Preventing infection    Shower or bathe the night before and morning of your surgery. Follow the instructions your clinic gave you. (If no instructions, use regular soap.)    Don't shave or clip hair near your surgery site. We'll remove the hair if needed.    Don't smoke or vape the morning of surgery. You may chew nicotine gum up to 2 hours before surgery. A nicotine patch is okay.  ? Note: Some surgeries require you to completely quit smoking and nicotine. Check with your surgeon.    Your care team will make every effort to keep you safe from infection. We will:  ? Clean our hands often with soap and water (or an alcohol-based hand rub).  ? Clean the skin at your surgery site with a special soap that kills germs.  ? Give you a special gown to keep you warm. (Cold raises the risk of infection.)  ? Wear special hair covers, masks, gowns and gloves during surgery.  ? Give antibiotic medicine, if prescribed. Not all surgeries need antibiotics.  What to bring on the day of surgery    Photo ID and insurance card    Copy of your health care directive, if you have one    Glasses and hearing aids (bring  cases)  ? You can't wear contacts during surgery    Inhaler and eye drops, if you use them (tell us about these when you arrive)    CPAP machine or breathing device, if you use them    A few personal items, if spending the night    If you have . . .  ? A pacemaker, ICD (cardiac defibrillator) or other implant: Bring the ID card.  ? An implanted stimulator: Bring the remote control.  ? A legal guardian: Bring a copy of the certified (court-stamped) guardianship papers.  Please remove any jewelry, including body piercings. Leave jewelry and other valuables at home.  If you're going home the day of surgery    You must have a responsible adult drive you home. They should stay with you overnight as well.    If you don't have someone to stay with you, and you aren't safe to go home alone, we may keep you overnight. Insurance often won't pay for this.  After surgery  If it's hard to control your pain or you need more pain medicine, please call your surgeon's office.  Questions?   If you have any questions for your care team, list them here: _________________________________________________________________________________________________________________________________________________________________________ ____________________________________ ____________________________________ ____________________________________

## 2023-03-27 NOTE — PROGRESS NOTES
[address] [address]  Phone: [phone]  Primary Provider: Allison Sterling  Pre-op Performing Provider: CHRIS OWENS      PREOPERATIVE EVALUATION:  Today's date: 3/27/2023    Kellee Turk is a 23 year old female who presents for a preoperative evaluation.  No flowsheet data found.  Surgical Information:  Surgery/Procedure: REPAIR, PECTUS EXCAVATUM, sternal plating, cryoablation intercostal nerve.  Surgery Location: [facility]   Surgeon: Bay Nevarez MD  Surgery Date: 4/24/2023  Time of Surgery: 7:30 AM  Where patient plans to recover: At home with family  Fax number for surgical facility: Note does not need to be faxed, will be available electronically in Epic.    Assessment & Plan     The proposed surgical procedure is considered INTERMEDIATE risk.    Preop general physical exam  Cleared for procedure  - HCG qualitative urine; Future  - CBC with platelets and differential; Future  - INR; Future    Pectus excavatum  Surgical repair    Need for prophylactic vaccination and inoculation against influenza    - INFLUENZA VACCINE IM > 6 MONTHS VALENT IIV4 (AFLURIA/FLUZONE)    Screen for STD (sexually transmitted disease)  Patient requested screening  - HIV Antigen Antibody Combo; Future  - Hepatitis C antibody; Future  - Hepatitis B surface antigen; Future  - Treponema Abs w Reflex to RPR and Titer; Future  - NEISSERIA GONORRHOEA PCR; Future  - CHLAMYDIA TRACHOMATIS PCR; Future      RECOMMENDATION:  APPROVAL GIVEN to proceed with proposed procedure, without further diagnostic evaluation.      Subjective     HPI related to upcoming procedure: congential chest malformation causing discomfort with physical exertion.    Answers for HPI/ROS submitted by the patient on 3/27/2023  If you checked off any problems, how difficult have these problems made it for you to do your  work, take care of things at home, or get along with other people?: Not difficult at all  PHQ9 TOTAL SCORE: 3    Preop Questions 3/27/2023   1. Have you ever had a heart attack or stroke? No   2. Have you ever had surgery on your heart or blood vessels, such as a stent placement, a coronary artery bypass, or surgery on an artery in your head, neck, heart, or legs? No   3. Do you have chest pain with activity? YES - reason for surgery   4. Do you have a history of  heart failure? No   5. Do you currently have a cold, bronchitis or symptoms of other infection? No   6. Do you have a cough, shortness of breath, or wheezing? No   7. Do you or anyone in your family have previous history of blood clots? No   8. Do you or does anyone in your family have a serious bleeding problem such as prolonged bleeding following surgeries or cuts? No   9. Have you ever had problems with anemia or been told to take iron pills? No   10. Have you had any abnormal blood loss such as black, tarry or bloody stools, or abnormal vaginal bleeding? No   11. Have you ever had a blood transfusion? No   12. Are you willing to have a blood transfusion if it is medically needed before, during, or after your surgery? Yes   13. Have you or any of your relatives ever had problems with anesthesia? No   14. Do you have sleep apnea, excessive snoring or daytime drowsiness? No   15. Do you have any artifical heart valves or other implanted medical devices like a pacemaker, defibrillator, or continuous glucose monitor? No   16. Do you have artificial joints? No   17. Are you allergic to latex? No   18. Is there any chance that you may be pregnant? No       Health Care Directive:  Patient does not have a Health Care Directive or Living Will: Discussed advance care planning with patient; however, patient declined at this time.    Preoperative Review of :   reviewed - no record of controlled substances prescribed.      Review of Systems  Constitutional,  "neuro, ENT, endocrine, pulmonary, cardiac, gastrointestinal, genitourinary, musculoskeletal, integument and psychiatric systems are negative, except as otherwise noted.    Patient Active Problem List    Diagnosis Date Noted     Major depressive disorder, single episode, moderate (H) 05/04/2016     Priority: Medium     Problem with child being bullied, initial encounter 05/04/2016     Priority: Medium     Abdominal pain, chronic, generalized 03/14/2016     Priority: Medium     Underweight 02/16/2016     Priority: Medium     Pectus excavatum 08/07/2014     Priority: Medium      History reviewed. No pertinent past medical history.  History reviewed. No pertinent surgical history.  Current Outpatient Medications   Medication Sig Dispense Refill     Multiple Vitamin (MULTIVITAMIN OR) Take  by mouth.         Allergies   Allergen Reactions     Amoxicillin Shortness Of Breath and Rash     Watermelon Flavor         Social History     Tobacco Use     Smoking status: Never     Passive exposure: Never     Smokeless tobacco: Never   Substance Use Topics     Alcohol use: No     Alcohol/week: 0.0 standard drinks       History   Drug Use No         Objective     /75 (BP Location: Left arm, Patient Position: Sitting, Cuff Size: Adult Small)   Pulse 60   Temp 97.5  F (36.4  C) (Tympanic)   Resp 13   Ht 1.689 m (5' 6.5\")   Wt 38.6 kg (85 lb 3.2 oz)   LMP 03/27/2023 (Within Days)   SpO2 98%   BMI 13.55 kg/m      Physical Exam    GENERAL APPEARANCE: healthy, alert, no distress and underweight     EYES: EOMI, PERRL     HENT: ear canals and TM's normal and nose and mouth without ulcers or lesions     NECK: no adenopathy, no asymmetry, masses, or scars and thyroid normal to palpation     RESP: lungs clear to auscultation - no rales, rhonchi or wheezes     CV: regular rates and rhythm, normal S1 S2, no S3 or S4 and no murmur, click or rub     ABDOMEN:  soft, nontender, no HSM or masses and bowel sounds normal     MS: " extremities normal- no gross deformities noted, no evidence of inflammation in joints, FROM in all extremities.     SKIN: no suspicious lesions or rashes     NEURO: Normal strength and tone, sensory exam grossly normal, mentation intact and speech normal     PSYCH: mentation appears normal. and affect normal/bright     LYMPHATICS: No cervical adenopathy    No results for input(s): HGB, PLT, INR, NA, POTASSIUM, CR, A1C in the last 58108 hours.     Diagnostics:  Labs pending at this time.  Results will be reviewed when available.   No EKG required, no history of coronary heart disease, significant arrhythmia, peripheral arterial disease or other structural heart disease.    Revised Cardiac Risk Index (RCRI):  The patient has the following serious cardiovascular risks for perioperative complications:   - No serious cardiac risks = 0 points     RCRI Interpretation: 0 points: Class I (very low risk - 0.4% complication rate)           Signed Electronically by: Kelsey Hunter MD  Copy of this evaluation report is provided to requesting physician.

## 2023-03-28 LAB
C TRACH DNA SPEC QL NAA+PROBE: NEGATIVE
HBV SURFACE AG SERPL QL IA: NONREACTIVE
HCV AB SERPL QL IA: NONREACTIVE
HIV 1+2 AB+HIV1 P24 AG SERPL QL IA: NONREACTIVE
N GONORRHOEA DNA SPEC QL NAA+PROBE: NEGATIVE
T PALLIDUM AB SER QL: NONREACTIVE

## 2023-03-28 NOTE — RESULT ENCOUNTER NOTE
Ms. Turk,    All of your labs were normal for you.  Neither hepatitis B, hepatitis C, HIV, syphilis, gonorrhea nor chlamydia were found.     Please contact the clinic if you have additional questions.  Thank you.    Sincerely,    Kelsey Hunter MD

## 2023-04-21 ENCOUNTER — ANESTHESIA EVENT (OUTPATIENT)
Dept: SURGERY | Facility: CLINIC | Age: 23
DRG: 517 | End: 2023-04-21
Payer: COMMERCIAL

## 2023-04-24 ENCOUNTER — APPOINTMENT (OUTPATIENT)
Dept: GENERAL RADIOLOGY | Facility: CLINIC | Age: 23
DRG: 517 | End: 2023-04-24
Attending: THORACIC SURGERY (CARDIOTHORACIC VASCULAR SURGERY)
Payer: COMMERCIAL

## 2023-04-24 ENCOUNTER — HOSPITAL ENCOUNTER (INPATIENT)
Facility: CLINIC | Age: 23
LOS: 3 days | Discharge: HOME | DRG: 517 | End: 2023-04-27
Attending: THORACIC SURGERY (CARDIOTHORACIC VASCULAR SURGERY) | Admitting: THORACIC SURGERY (CARDIOTHORACIC VASCULAR SURGERY)
Payer: COMMERCIAL

## 2023-04-24 ENCOUNTER — ANESTHESIA (OUTPATIENT)
Dept: SURGERY | Facility: CLINIC | Age: 23
DRG: 517 | End: 2023-04-24
Payer: COMMERCIAL

## 2023-04-24 DIAGNOSIS — Q67.6 PECTUS EXCAVATUM: Primary | ICD-10-CM

## 2023-04-24 LAB
ABO/RH(D): NORMAL
ALBUMIN SERPL BCG-MCNC: 4.2 G/DL (ref 3.5–5.2)
ANION GAP SERPL CALCULATED.3IONS-SCNC: 14 MMOL/L (ref 7–15)
ANTIBODY SCREEN: NEGATIVE
BUN SERPL-MCNC: 15.1 MG/DL (ref 6–20)
CALCIUM SERPL-MCNC: 8.9 MG/DL (ref 8.6–10)
CHLORIDE SERPL-SCNC: 102 MMOL/L (ref 98–107)
CREAT SERPL-MCNC: 0.88 MG/DL (ref 0.51–0.95)
DEPRECATED HCO3 PLAS-SCNC: 18 MMOL/L (ref 22–29)
GFR SERPL CREATININE-BSD FRML MDRD: >90 ML/MIN/1.73M2
GLUCOSE BLDC GLUCOMTR-MCNC: 94 MG/DL (ref 70–99)
GLUCOSE SERPL-MCNC: 160 MG/DL (ref 70–99)
PHOSPHATE SERPL-MCNC: 4.6 MG/DL (ref 2.5–4.5)
PLATELET # BLD AUTO: 208 10E3/UL (ref 150–450)
POTASSIUM SERPL-SCNC: 4.4 MMOL/L (ref 3.4–5.3)
SODIUM SERPL-SCNC: 134 MMOL/L (ref 136–145)
SPECIMEN EXPIRATION DATE: NORMAL

## 2023-04-24 PROCEDURE — 250N000013 HC RX MED GY IP 250 OP 250 PS 637: Performed by: THORACIC SURGERY (CARDIOTHORACIC VASCULAR SURGERY)

## 2023-04-24 PROCEDURE — 250N000025 HC SEVOFLURANE, PER MIN: Performed by: THORACIC SURGERY (CARDIOTHORACIC VASCULAR SURGERY)

## 2023-04-24 PROCEDURE — 71045 X-RAY EXAM CHEST 1 VIEW: CPT | Mod: 26 | Performed by: RADIOLOGY

## 2023-04-24 PROCEDURE — 250N000011 HC RX IP 250 OP 636: Performed by: STUDENT IN AN ORGANIZED HEALTH CARE EDUCATION/TRAINING PROGRAM

## 2023-04-24 PROCEDURE — C2618 PROBE/NEEDLE, CRYO: HCPCS | Performed by: THORACIC SURGERY (CARDIOTHORACIC VASCULAR SURGERY)

## 2023-04-24 PROCEDURE — 258N000003 HC RX IP 258 OP 636: Performed by: NURSE ANESTHETIST, CERTIFIED REGISTERED

## 2023-04-24 PROCEDURE — 120N000002 HC R&B MED SURG/OB UMMC

## 2023-04-24 PROCEDURE — 370N000017 HC ANESTHESIA TECHNICAL FEE, PER MIN: Performed by: THORACIC SURGERY (CARDIOTHORACIC VASCULAR SURGERY)

## 2023-04-24 PROCEDURE — 36415 COLL VENOUS BLD VENIPUNCTURE: CPT | Performed by: THORACIC SURGERY (CARDIOTHORACIC VASCULAR SURGERY)

## 2023-04-24 PROCEDURE — 250N000011 HC RX IP 250 OP 636: Performed by: NURSE ANESTHETIST, CERTIFIED REGISTERED

## 2023-04-24 PROCEDURE — C1713 ANCHOR/SCREW BN/BN,TIS/BN: HCPCS | Performed by: THORACIC SURGERY (CARDIOTHORACIC VASCULAR SURGERY)

## 2023-04-24 PROCEDURE — 0PS000Z REPOSITION STERNUM WITH RIGID PLATE INTERNAL FIXATION DEVICE, OPEN APPROACH: ICD-10-PCS | Performed by: THORACIC SURGERY (CARDIOTHORACIC VASCULAR SURGERY)

## 2023-04-24 PROCEDURE — 01530ZZ DESTRUCTION OF BRACHIAL PLEXUS, OPEN APPROACH: ICD-10-PCS | Performed by: THORACIC SURGERY (CARDIOTHORACIC VASCULAR SURGERY)

## 2023-04-24 PROCEDURE — 250N000009 HC RX 250: Performed by: NURSE ANESTHETIST, CERTIFIED REGISTERED

## 2023-04-24 PROCEDURE — 999N000065 XR CHEST PORT 1 VIEW

## 2023-04-24 PROCEDURE — 250N000011 HC RX IP 250 OP 636: Performed by: THORACIC SURGERY (CARDIOTHORACIC VASCULAR SURGERY)

## 2023-04-24 PROCEDURE — 250N000013 HC RX MED GY IP 250 OP 250 PS 637: Performed by: STUDENT IN AN ORGANIZED HEALTH CARE EDUCATION/TRAINING PROGRAM

## 2023-04-24 PROCEDURE — 80069 RENAL FUNCTION PANEL: CPT | Performed by: THORACIC SURGERY (CARDIOTHORACIC VASCULAR SURGERY)

## 2023-04-24 PROCEDURE — 250N000013 HC RX MED GY IP 250 OP 250 PS 637: Performed by: SURGERY

## 2023-04-24 PROCEDURE — 710N000010 HC RECOVERY PHASE 1, LEVEL 2, PER MIN: Performed by: THORACIC SURGERY (CARDIOTHORACIC VASCULAR SURGERY)

## 2023-04-24 PROCEDURE — 21740 RECONSTRUCTION OF STERNUM: CPT | Mod: GC | Performed by: THORACIC SURGERY (CARDIOTHORACIC VASCULAR SURGERY)

## 2023-04-24 PROCEDURE — 86850 RBC ANTIBODY SCREEN: CPT | Performed by: STUDENT IN AN ORGANIZED HEALTH CARE EDUCATION/TRAINING PROGRAM

## 2023-04-24 PROCEDURE — 85049 AUTOMATED PLATELET COUNT: CPT | Performed by: THORACIC SURGERY (CARDIOTHORACIC VASCULAR SURGERY)

## 2023-04-24 PROCEDURE — 999N000141 HC STATISTIC PRE-PROCEDURE NURSING ASSESSMENT: Performed by: THORACIC SURGERY (CARDIOTHORACIC VASCULAR SURGERY)

## 2023-04-24 PROCEDURE — 250N000011 HC RX IP 250 OP 636: Performed by: SURGERY

## 2023-04-24 PROCEDURE — 86901 BLOOD TYPING SEROLOGIC RH(D): CPT | Performed by: STUDENT IN AN ORGANIZED HEALTH CARE EDUCATION/TRAINING PROGRAM

## 2023-04-24 PROCEDURE — 360N000076 HC SURGERY LEVEL 3, PER MIN: Performed by: THORACIC SURGERY (CARDIOTHORACIC VASCULAR SURGERY)

## 2023-04-24 PROCEDURE — 272N000001 HC OR GENERAL SUPPLY STERILE: Performed by: THORACIC SURGERY (CARDIOTHORACIC VASCULAR SURGERY)

## 2023-04-24 DEVICE — LEVEL ONE THORACIC, SCREW, STERNAL, MAXDRIVE, LOCKING, DRILL: Type: IMPLANTABLE DEVICE | Site: CHEST | Status: FUNCTIONAL

## 2023-04-24 DEVICE — IMPLANTABLE DEVICE: Type: IMPLANTABLE DEVICE | Site: CHEST | Status: FUNCTIONAL

## 2023-04-24 RX ORDER — METHOCARBAMOL 750 MG/1
750 TABLET, FILM COATED ORAL EVERY 6 HOURS PRN
Status: DISCONTINUED | OUTPATIENT
Start: 2023-04-24 | End: 2023-04-27 | Stop reason: HOSPADM

## 2023-04-24 RX ORDER — LIDOCAINE 40 MG/G
CREAM TOPICAL
Status: DISCONTINUED | OUTPATIENT
Start: 2023-04-24 | End: 2023-04-24 | Stop reason: HOSPADM

## 2023-04-24 RX ORDER — SODIUM CHLORIDE, SODIUM LACTATE, POTASSIUM CHLORIDE, CALCIUM CHLORIDE 600; 310; 30; 20 MG/100ML; MG/100ML; MG/100ML; MG/100ML
INJECTION, SOLUTION INTRAVENOUS CONTINUOUS
Status: DISCONTINUED | OUTPATIENT
Start: 2023-04-24 | End: 2023-04-24 | Stop reason: HOSPADM

## 2023-04-24 RX ORDER — NALOXONE HYDROCHLORIDE 0.4 MG/ML
0.2 INJECTION, SOLUTION INTRAMUSCULAR; INTRAVENOUS; SUBCUTANEOUS
Status: DISCONTINUED | OUTPATIENT
Start: 2023-04-24 | End: 2023-04-24 | Stop reason: HOSPADM

## 2023-04-24 RX ORDER — CLINDAMYCIN PHOSPHATE 900 MG/50ML
900 INJECTION, SOLUTION INTRAVENOUS
Status: COMPLETED | OUTPATIENT
Start: 2023-04-24 | End: 2023-04-24

## 2023-04-24 RX ORDER — LIDOCAINE 40 MG/G
CREAM TOPICAL
Status: DISCONTINUED | OUTPATIENT
Start: 2023-04-24 | End: 2023-04-27 | Stop reason: HOSPADM

## 2023-04-24 RX ORDER — HALOPERIDOL 5 MG/ML
1 INJECTION INTRAMUSCULAR
Status: DISCONTINUED | OUTPATIENT
Start: 2023-04-24 | End: 2023-04-24 | Stop reason: HOSPADM

## 2023-04-24 RX ORDER — FLUMAZENIL 0.1 MG/ML
0.2 INJECTION, SOLUTION INTRAVENOUS
Status: DISCONTINUED | OUTPATIENT
Start: 2023-04-24 | End: 2023-04-24 | Stop reason: HOSPADM

## 2023-04-24 RX ORDER — METHOCARBAMOL 500 MG/1
500 TABLET, FILM COATED ORAL ONCE
Status: COMPLETED | OUTPATIENT
Start: 2023-04-24 | End: 2023-04-24

## 2023-04-24 RX ORDER — ACETAMINOPHEN 325 MG/1
975 TABLET ORAL EVERY 8 HOURS
Status: COMPLETED | OUTPATIENT
Start: 2023-04-24 | End: 2023-04-27

## 2023-04-24 RX ORDER — FENTANYL CITRATE 50 UG/ML
INJECTION, SOLUTION INTRAMUSCULAR; INTRAVENOUS PRN
Status: DISCONTINUED | OUTPATIENT
Start: 2023-04-24 | End: 2023-04-24

## 2023-04-24 RX ORDER — HYDROMORPHONE HCL IN WATER/PF 6 MG/30 ML
0.4 PATIENT CONTROLLED ANALGESIA SYRINGE INTRAVENOUS EVERY 5 MIN PRN
Status: DISCONTINUED | OUTPATIENT
Start: 2023-04-24 | End: 2023-04-24 | Stop reason: HOSPADM

## 2023-04-24 RX ORDER — FENTANYL CITRATE 50 UG/ML
50 INJECTION, SOLUTION INTRAMUSCULAR; INTRAVENOUS EVERY 5 MIN PRN
Status: DISCONTINUED | OUTPATIENT
Start: 2023-04-24 | End: 2023-04-24 | Stop reason: HOSPADM

## 2023-04-24 RX ORDER — HYDRALAZINE HYDROCHLORIDE 20 MG/ML
2.5-5 INJECTION INTRAMUSCULAR; INTRAVENOUS EVERY 10 MIN PRN
Status: DISCONTINUED | OUTPATIENT
Start: 2023-04-24 | End: 2023-04-24 | Stop reason: HOSPADM

## 2023-04-24 RX ORDER — DIPHENHYDRAMINE HYDROCHLORIDE 50 MG/ML
25 INJECTION INTRAMUSCULAR; INTRAVENOUS EVERY 6 HOURS PRN
Status: DISCONTINUED | OUTPATIENT
Start: 2023-04-24 | End: 2023-04-27 | Stop reason: HOSPADM

## 2023-04-24 RX ORDER — LIDOCAINE HYDROCHLORIDE 20 MG/ML
INJECTION, SOLUTION INFILTRATION; PERINEURAL PRN
Status: DISCONTINUED | OUTPATIENT
Start: 2023-04-24 | End: 2023-04-24

## 2023-04-24 RX ORDER — DEXAMETHASONE SODIUM PHOSPHATE 4 MG/ML
INJECTION, SOLUTION INTRA-ARTICULAR; INTRALESIONAL; INTRAMUSCULAR; INTRAVENOUS; SOFT TISSUE PRN
Status: DISCONTINUED | OUTPATIENT
Start: 2023-04-24 | End: 2023-04-24

## 2023-04-24 RX ORDER — CALCIUM CARBONATE 500 MG/1
500 TABLET, CHEWABLE ORAL 4 TIMES DAILY PRN
Status: DISCONTINUED | OUTPATIENT
Start: 2023-04-24 | End: 2023-04-27 | Stop reason: HOSPADM

## 2023-04-24 RX ORDER — OXYCODONE HYDROCHLORIDE 5 MG/1
5 TABLET ORAL EVERY 4 HOURS PRN
Status: DISCONTINUED | OUTPATIENT
Start: 2023-04-24 | End: 2023-04-24 | Stop reason: HOSPADM

## 2023-04-24 RX ORDER — ONDANSETRON 4 MG/1
4 TABLET, ORALLY DISINTEGRATING ORAL EVERY 30 MIN PRN
Status: DISCONTINUED | OUTPATIENT
Start: 2023-04-24 | End: 2023-04-24 | Stop reason: HOSPADM

## 2023-04-24 RX ORDER — NALOXONE HYDROCHLORIDE 0.4 MG/ML
0.4 INJECTION, SOLUTION INTRAMUSCULAR; INTRAVENOUS; SUBCUTANEOUS
Status: DISCONTINUED | OUTPATIENT
Start: 2023-04-24 | End: 2023-04-27 | Stop reason: HOSPADM

## 2023-04-24 RX ORDER — DIPHENHYDRAMINE HCL 25 MG
25 CAPSULE ORAL EVERY 6 HOURS PRN
Status: DISCONTINUED | OUTPATIENT
Start: 2023-04-24 | End: 2023-04-27 | Stop reason: HOSPADM

## 2023-04-24 RX ORDER — ACETAMINOPHEN 325 MG/1
975 TABLET ORAL ONCE
Status: COMPLETED | OUTPATIENT
Start: 2023-04-24 | End: 2023-04-24

## 2023-04-24 RX ORDER — GINSENG 100 MG
CAPSULE ORAL
Status: DISCONTINUED | OUTPATIENT
Start: 2023-04-24 | End: 2023-04-27 | Stop reason: HOSPADM

## 2023-04-24 RX ORDER — ONDANSETRON 2 MG/ML
INJECTION INTRAMUSCULAR; INTRAVENOUS PRN
Status: DISCONTINUED | OUTPATIENT
Start: 2023-04-24 | End: 2023-04-24

## 2023-04-24 RX ORDER — ONDANSETRON 4 MG/1
4 TABLET, ORALLY DISINTEGRATING ORAL EVERY 6 HOURS PRN
Status: DISCONTINUED | OUTPATIENT
Start: 2023-04-24 | End: 2023-04-27 | Stop reason: HOSPADM

## 2023-04-24 RX ORDER — FENTANYL CITRATE 50 UG/ML
25-50 INJECTION, SOLUTION INTRAMUSCULAR; INTRAVENOUS
Status: DISCONTINUED | OUTPATIENT
Start: 2023-04-24 | End: 2023-04-24 | Stop reason: HOSPADM

## 2023-04-24 RX ORDER — PROCHLORPERAZINE MALEATE 5 MG
10 TABLET ORAL EVERY 6 HOURS PRN
Status: DISCONTINUED | OUTPATIENT
Start: 2023-04-24 | End: 2023-04-27 | Stop reason: HOSPADM

## 2023-04-24 RX ORDER — HYDROMORPHONE HCL IN WATER/PF 6 MG/30 ML
0.2 PATIENT CONTROLLED ANALGESIA SYRINGE INTRAVENOUS EVERY 5 MIN PRN
Status: DISCONTINUED | OUTPATIENT
Start: 2023-04-24 | End: 2023-04-24 | Stop reason: HOSPADM

## 2023-04-24 RX ORDER — ONDANSETRON 2 MG/ML
4 INJECTION INTRAMUSCULAR; INTRAVENOUS EVERY 6 HOURS PRN
Status: DISCONTINUED | OUTPATIENT
Start: 2023-04-24 | End: 2023-04-27 | Stop reason: HOSPADM

## 2023-04-24 RX ORDER — SODIUM CHLORIDE, SODIUM LACTATE, POTASSIUM CHLORIDE, CALCIUM CHLORIDE 600; 310; 30; 20 MG/100ML; MG/100ML; MG/100ML; MG/100ML
INJECTION, SOLUTION INTRAVENOUS CONTINUOUS PRN
Status: DISCONTINUED | OUTPATIENT
Start: 2023-04-24 | End: 2023-04-24

## 2023-04-24 RX ORDER — HYDROMORPHONE HCL IN WATER/PF 6 MG/30 ML
0.2 PATIENT CONTROLLED ANALGESIA SYRINGE INTRAVENOUS
Status: DISCONTINUED | OUTPATIENT
Start: 2023-04-24 | End: 2023-04-27 | Stop reason: HOSPADM

## 2023-04-24 RX ORDER — NALOXONE HYDROCHLORIDE 0.4 MG/ML
0.2 INJECTION, SOLUTION INTRAMUSCULAR; INTRAVENOUS; SUBCUTANEOUS
Status: DISCONTINUED | OUTPATIENT
Start: 2023-04-24 | End: 2023-04-27 | Stop reason: HOSPADM

## 2023-04-24 RX ORDER — HYDROMORPHONE HCL IN WATER/PF 6 MG/30 ML
0.4 PATIENT CONTROLLED ANALGESIA SYRINGE INTRAVENOUS
Status: DISCONTINUED | OUTPATIENT
Start: 2023-04-24 | End: 2023-04-27 | Stop reason: HOSPADM

## 2023-04-24 RX ORDER — ONDANSETRON 2 MG/ML
4 INJECTION INTRAMUSCULAR; INTRAVENOUS EVERY 30 MIN PRN
Status: DISCONTINUED | OUTPATIENT
Start: 2023-04-24 | End: 2023-04-24 | Stop reason: HOSPADM

## 2023-04-24 RX ORDER — ALBUTEROL SULFATE 0.83 MG/ML
2.5 SOLUTION RESPIRATORY (INHALATION) EVERY 4 HOURS PRN
Status: DISCONTINUED | OUTPATIENT
Start: 2023-04-24 | End: 2023-04-24 | Stop reason: HOSPADM

## 2023-04-24 RX ORDER — FENTANYL CITRATE 50 UG/ML
25 INJECTION, SOLUTION INTRAMUSCULAR; INTRAVENOUS EVERY 5 MIN PRN
Status: DISCONTINUED | OUTPATIENT
Start: 2023-04-24 | End: 2023-04-24 | Stop reason: HOSPADM

## 2023-04-24 RX ORDER — ACETAMINOPHEN 325 MG/1
975 TABLET ORAL EVERY 6 HOURS PRN
Status: DISCONTINUED | OUTPATIENT
Start: 2023-04-24 | End: 2023-04-24 | Stop reason: HOSPADM

## 2023-04-24 RX ORDER — BISACODYL 10 MG
10 SUPPOSITORY, RECTAL RECTAL DAILY PRN
Status: DISCONTINUED | OUTPATIENT
Start: 2023-04-24 | End: 2023-04-27 | Stop reason: HOSPADM

## 2023-04-24 RX ORDER — NALOXONE HYDROCHLORIDE 0.4 MG/ML
0.4 INJECTION, SOLUTION INTRAMUSCULAR; INTRAVENOUS; SUBCUTANEOUS
Status: DISCONTINUED | OUTPATIENT
Start: 2023-04-24 | End: 2023-04-24 | Stop reason: HOSPADM

## 2023-04-24 RX ORDER — ENOXAPARIN SODIUM 100 MG/ML
30 INJECTION SUBCUTANEOUS EVERY 24 HOURS
Status: DISCONTINUED | OUTPATIENT
Start: 2023-04-25 | End: 2023-04-27 | Stop reason: HOSPADM

## 2023-04-24 RX ORDER — CLINDAMYCIN PHOSPHATE 900 MG/50ML
900 INJECTION, SOLUTION INTRAVENOUS SEE ADMIN INSTRUCTIONS
Status: DISCONTINUED | OUTPATIENT
Start: 2023-04-24 | End: 2023-04-24 | Stop reason: HOSPADM

## 2023-04-24 RX ORDER — GABAPENTIN 100 MG/1
200 CAPSULE ORAL 3 TIMES DAILY
Status: DISCONTINUED | OUTPATIENT
Start: 2023-04-24 | End: 2023-04-27 | Stop reason: HOSPADM

## 2023-04-24 RX ORDER — FAMOTIDINE 20 MG/1
20 TABLET, FILM COATED ORAL 2 TIMES DAILY
Status: DISCONTINUED | OUTPATIENT
Start: 2023-04-24 | End: 2023-04-27 | Stop reason: HOSPADM

## 2023-04-24 RX ORDER — PROPOFOL 10 MG/ML
INJECTION, EMULSION INTRAVENOUS PRN
Status: DISCONTINUED | OUTPATIENT
Start: 2023-04-24 | End: 2023-04-24

## 2023-04-24 RX ORDER — LABETALOL HYDROCHLORIDE 5 MG/ML
10 INJECTION, SOLUTION INTRAVENOUS
Status: DISCONTINUED | OUTPATIENT
Start: 2023-04-24 | End: 2023-04-24 | Stop reason: HOSPADM

## 2023-04-24 RX ORDER — ACETAMINOPHEN 325 MG/1
650 TABLET ORAL EVERY 4 HOURS PRN
Status: DISCONTINUED | OUTPATIENT
Start: 2023-04-27 | End: 2023-04-27 | Stop reason: HOSPADM

## 2023-04-24 RX ORDER — POLYETHYLENE GLYCOL 3350 17 G/17G
17 POWDER, FOR SOLUTION ORAL DAILY
Status: DISCONTINUED | OUTPATIENT
Start: 2023-04-25 | End: 2023-04-27 | Stop reason: HOSPADM

## 2023-04-24 RX ORDER — OXYCODONE HYDROCHLORIDE 5 MG/1
5 TABLET ORAL EVERY 4 HOURS PRN
Status: DISCONTINUED | OUTPATIENT
Start: 2023-04-24 | End: 2023-04-27 | Stop reason: HOSPADM

## 2023-04-24 RX ORDER — OXYCODONE HYDROCHLORIDE 10 MG/1
10 TABLET ORAL EVERY 4 HOURS PRN
Status: DISCONTINUED | OUTPATIENT
Start: 2023-04-24 | End: 2023-04-27 | Stop reason: HOSPADM

## 2023-04-24 RX ORDER — AMOXICILLIN 250 MG
1 CAPSULE ORAL 2 TIMES DAILY
Status: DISCONTINUED | OUTPATIENT
Start: 2023-04-24 | End: 2023-04-27 | Stop reason: HOSPADM

## 2023-04-24 RX ADMIN — PROCHLORPERAZINE EDISYLATE 10 MG: 5 INJECTION INTRAMUSCULAR; INTRAVENOUS at 18:24

## 2023-04-24 RX ADMIN — SUGAMMADEX 100 MG: 100 INJECTION, SOLUTION INTRAVENOUS at 10:31

## 2023-04-24 RX ADMIN — DEXMEDETOMIDINE HYDROCHLORIDE 0.5 MCG/KG/HR: 100 INJECTION, SOLUTION INTRAVENOUS at 08:58

## 2023-04-24 RX ADMIN — Medication 20 MG: at 08:32

## 2023-04-24 RX ADMIN — HYDROMORPHONE HYDROCHLORIDE 0.2 MG: 0.2 INJECTION, SOLUTION INTRAMUSCULAR; INTRAVENOUS; SUBCUTANEOUS at 11:57

## 2023-04-24 RX ADMIN — Medication 5 MG: at 09:29

## 2023-04-24 RX ADMIN — PHENYLEPHRINE HYDROCHLORIDE 50 MCG: 10 INJECTION INTRAVENOUS at 08:02

## 2023-04-24 RX ADMIN — ACETAMINOPHEN 975 MG: 325 TABLET, FILM COATED ORAL at 06:03

## 2023-04-24 RX ADMIN — METHOCARBAMOL 500 MG: 500 TABLET ORAL at 12:30

## 2023-04-24 RX ADMIN — METHOCARBAMOL 750 MG: 750 TABLET ORAL at 20:07

## 2023-04-24 RX ADMIN — GABAPENTIN 200 MG: 100 CAPSULE ORAL at 20:08

## 2023-04-24 RX ADMIN — LIDOCAINE HYDROCHLORIDE 40 MG: 20 INJECTION, SOLUTION INFILTRATION; PERINEURAL at 07:54

## 2023-04-24 RX ADMIN — MIDAZOLAM 2 MG: 1 INJECTION INTRAMUSCULAR; INTRAVENOUS at 07:43

## 2023-04-24 RX ADMIN — HYDROMORPHONE HYDROCHLORIDE 0.2 MG: 0.2 INJECTION, SOLUTION INTRAMUSCULAR; INTRAVENOUS; SUBCUTANEOUS at 12:38

## 2023-04-24 RX ADMIN — ONDANSETRON 4 MG: 2 INJECTION INTRAMUSCULAR; INTRAVENOUS at 14:39

## 2023-04-24 RX ADMIN — ONDANSETRON 4 MG: 2 INJECTION INTRAMUSCULAR; INTRAVENOUS at 22:18

## 2023-04-24 RX ADMIN — HYDROMORPHONE HYDROCHLORIDE 0.5 MG: 1 INJECTION, SOLUTION INTRAMUSCULAR; INTRAVENOUS; SUBCUTANEOUS at 08:37

## 2023-04-24 RX ADMIN — DEXMEDETOMIDINE HYDROCHLORIDE 4 MCG: 100 INJECTION, SOLUTION INTRAVENOUS at 09:01

## 2023-04-24 RX ADMIN — Medication 5 MG: at 09:52

## 2023-04-24 RX ADMIN — PHENYLEPHRINE HYDROCHLORIDE 50 MCG: 10 INJECTION INTRAVENOUS at 08:16

## 2023-04-24 RX ADMIN — FENTANYL CITRATE 50 MCG: 50 INJECTION, SOLUTION INTRAMUSCULAR; INTRAVENOUS at 08:27

## 2023-04-24 RX ADMIN — HYDROMORPHONE HYDROCHLORIDE 0.4 MG: 0.2 INJECTION, SOLUTION INTRAMUSCULAR; INTRAVENOUS; SUBCUTANEOUS at 16:54

## 2023-04-24 RX ADMIN — ACETAMINOPHEN 975 MG: 325 TABLET ORAL at 12:19

## 2023-04-24 RX ADMIN — FENTANYL CITRATE 50 MCG: 50 INJECTION, SOLUTION INTRAMUSCULAR; INTRAVENOUS at 07:54

## 2023-04-24 RX ADMIN — DEXAMETHASONE SODIUM PHOSPHATE 4 MG: 4 INJECTION, SOLUTION INTRA-ARTICULAR; INTRALESIONAL; INTRAMUSCULAR; INTRAVENOUS; SOFT TISSUE at 08:25

## 2023-04-24 RX ADMIN — HYDROMORPHONE HYDROCHLORIDE 0.2 MG: 0.2 INJECTION, SOLUTION INTRAMUSCULAR; INTRAVENOUS; SUBCUTANEOUS at 12:17

## 2023-04-24 RX ADMIN — FAMOTIDINE 20 MG: 20 TABLET ORAL at 20:08

## 2023-04-24 RX ADMIN — ACETAMINOPHEN 975 MG: 325 TABLET, FILM COATED ORAL at 20:06

## 2023-04-24 RX ADMIN — OXYCODONE HYDROCHLORIDE 5 MG: 5 TABLET ORAL at 14:31

## 2023-04-24 RX ADMIN — FENTANYL CITRATE 25 MCG: 50 INJECTION, SOLUTION INTRAMUSCULAR; INTRAVENOUS at 11:37

## 2023-04-24 RX ADMIN — FENTANYL CITRATE 25 MCG: 50 INJECTION, SOLUTION INTRAMUSCULAR; INTRAVENOUS at 11:48

## 2023-04-24 RX ADMIN — ONDANSETRON 4 MG: 2 INJECTION INTRAMUSCULAR; INTRAVENOUS at 10:31

## 2023-04-24 RX ADMIN — PHENYLEPHRINE HYDROCHLORIDE 50 MCG: 10 INJECTION INTRAVENOUS at 08:25

## 2023-04-24 RX ADMIN — CLINDAMYCIN PHOSPHATE 900 MG: 900 INJECTION, SOLUTION INTRAVENOUS at 06:26

## 2023-04-24 RX ADMIN — HYDROMORPHONE HYDROCHLORIDE 0.2 MG: 0.2 INJECTION, SOLUTION INTRAMUSCULAR; INTRAVENOUS; SUBCUTANEOUS at 12:08

## 2023-04-24 RX ADMIN — OXYCODONE HYDROCHLORIDE 10 MG: 5 TABLET ORAL at 22:22

## 2023-04-24 RX ADMIN — DEXMEDETOMIDINE HYDROCHLORIDE 4 MCG: 100 INJECTION, SOLUTION INTRAVENOUS at 08:58

## 2023-04-24 RX ADMIN — FENTANYL CITRATE 25 MCG: 50 INJECTION, SOLUTION INTRAMUSCULAR; INTRAVENOUS at 11:31

## 2023-04-24 RX ADMIN — GABAPENTIN 200 MG: 100 CAPSULE ORAL at 14:31

## 2023-04-24 RX ADMIN — PROPOFOL 100 MG: 10 INJECTION, EMULSION INTRAVENOUS at 07:54

## 2023-04-24 RX ADMIN — SODIUM CHLORIDE, POTASSIUM CHLORIDE, SODIUM LACTATE AND CALCIUM CHLORIDE: 600; 310; 30; 20 INJECTION, SOLUTION INTRAVENOUS at 07:54

## 2023-04-24 RX ADMIN — Medication 30 MG: at 07:54

## 2023-04-24 ASSESSMENT — ACTIVITIES OF DAILY LIVING (ADL)
ADLS_ACUITY_SCORE: 20
WALKING_OR_CLIMBING_STAIRS_DIFFICULTY: NO
HEARING_DIFFICULTY_OR_DEAF: NO
VISION_MANAGEMENT: GLASSES
ADLS_ACUITY_SCORE: 22
ADLS_ACUITY_SCORE: 20
ADLS_ACUITY_SCORE: 22
DRESSING/BATHING_DIFFICULTY: NO
CHANGE_IN_FUNCTIONAL_STATUS_SINCE_ONSET_OF_CURRENT_ILLNESS/INJURY: NO
FALL_HISTORY_WITHIN_LAST_SIX_MONTHS: NO
WEAR_GLASSES_OR_BLIND: YES
ADLS_ACUITY_SCORE: 22
ADLS_ACUITY_SCORE: 24
CONCENTRATING,_REMEMBERING_OR_MAKING_DECISIONS_DIFFICULTY: NO
TOILETING_ISSUES: NO
ADLS_ACUITY_SCORE: 20
DOING_ERRANDS_INDEPENDENTLY_DIFFICULTY: NO
ADLS_ACUITY_SCORE: 20
DIFFICULTY_COMMUNICATING: NO
ADLS_ACUITY_SCORE: 24

## 2023-04-24 NOTE — ANESTHESIA CARE TRANSFER NOTE
Patient: Kellee Turk    Procedure: Procedure(s):  REPAIR, PECTUS EXCAVATUM, sternal plating, cryoablation intercostal nerve.       Diagnosis: Pectus excavatum [Q67.6]  Diagnosis Additional Information: No value filed.    Anesthesia Type:   General     Note:    Oropharynx: oropharynx clear of all foreign objects and spontaneously breathing  Level of Consciousness: drowsy  Oxygen Supplementation: face mask  Level of Supplemental Oxygen (L/min / FiO2): 6  Independent Airway: airway patency satisfactory and stable  Dentition: dentition unchanged  Vital Signs Stable: post-procedure vital signs reviewed and stable  Report to RN Given: handoff report given  Patient transferred to: PACU    Handoff Report: Identifed the Patient, Identified the Reponsible Provider, Reviewed the pertinent medical history, Discussed the surgical course, Reviewed Intra-OP anesthesia mangement and issues during anesthesia, Set expectations for post-procedure period and Allowed opportunity for questions and acknowledgement of understanding      Vitals:  Vitals Value Taken Time   /77 04/24/23 1101   Temp     Pulse 107 04/24/23 1105   Resp 11    SpO2 100 % 04/24/23 1105   Vitals shown include unvalidated device data.    Electronically Signed By: MARISSA Ledbetter CRNA  April 24, 2023  11:06 AM   shortness of breath

## 2023-04-24 NOTE — BRIEF OP NOTE
[facility]    Brief Operative Note    Pre-operative diagnosis: Pectus excavatum [Q67.6]  Post-operative diagnosis Same as pre-operative diagnosis    Procedure: Procedure(s):  REPAIR, PECTUS EXCAVATUM, sternal plating, cryoablation intercostal nerve.  Surgeon: Surgeon(s) and Role:     * Bay Nevarez MD - Primary  Anesthesia: General   Estimated Blood Loss: 30cc    Drains: 19F MILLY, 14F pigtail on right   Specimens: * No specimens in log *  Findings:   Severe pectus excavatum. Pectus bar and sternal plating repair, 14F pigtail in the right pleural space  Complications: None.  Implants:   Implant Name Type Inv. Item Serial No.  Lot No. LRB No. Used Action   LEVEL ONE THORACIC, PLATE, STERNAL, LOCKING, Y SHP, 2.3 MM S - [id number] [employer] LEVEL ONE THORACIC, PLATE, STERNAL, LOCKING, Y SHP, 2.3 MM S  [employer] 00 N/A 1 Implanted   LEVEL ONE THORACIC, SCREW, STERNAL, MAXDRIVE, LOCKING, DRILL - [id number] [employer] LEVEL ONE THORACIC, SCREW, STERNAL, MAXDRIVE, LOCKING, DRILL  [employer] 00 N/A 6 Implanted

## 2023-04-24 NOTE — ANESTHESIA PROCEDURE NOTES
Airway       Patient location during procedure: OR       Procedure Start/Stop Times: 4/24/2023 7:57 AM  Staff -        Anesthesiologist:  Elizabeth Engel MD       CRNA: Caitlyn Benavidez APRN CRNA       Performed By: CRNAIndications and Patient Condition       Indications for airway management: sonali-procedural       Induction type:intravenous       Mask difficulty assessment: 1 - vent by mask    Final Airway Details       Final airway type: endotracheal airway       Successful airway: ETT - single  Endotracheal Airway Details        ETT size (mm): 6.5       Cuffed: yes       Successful intubation technique: direct laryngoscopy       DL Blade Type: Singh 2       Grade View of Cords: 1       Adjucts: stylet       Position: Right       Measured from: lips       Secured at (cm): 22       Bite block used: None    Post intubation assessment        Placement verified by: capnometry, equal breath sounds and chest rise        Number of attempts at approach: 1       Number of other approaches attempted: 0       Secured with: pink tape       Ease of procedure: easy       Dentition: Intact and Unchanged    Medication(s) Administered   Medication Administration Time: 4/24/2023 7:57 AM

## 2023-04-24 NOTE — ANESTHESIA POSTPROCEDURE EVALUATION
Patient: Kellee Turk    Procedure: Procedure(s):  REPAIR, PECTUS EXCAVATUM, sternal plating, cryoablation intercostal nerve.       Anesthesia Type:  General    Note:  Disposition: Inpatient   Postop Pain Control: Uneventful            Sign Out: Well controlled pain   PONV: No   Neuro/Psych: Uneventful            Sign Out: Acceptable/Baseline neuro status   Airway/Respiratory: Uneventful            Sign Out: Acceptable/Baseline resp. status   CV/Hemodynamics: Uneventful            Sign Out: Acceptable CV status; No obvious hypovolemia; No obvious fluid overload   Other NRE: NONE   DID A NON-ROUTINE EVENT OCCUR? No           Last vitals:  Vitals Value Taken Time   /65 04/24/23 1215   Temp 36.7  C (98.1  F) 04/24/23 1100   Pulse 88 04/24/23 1228   Resp 16 04/24/23 1200   SpO2 95 % 04/24/23 1228   Vitals shown include unvalidated device data.    Electronically Signed By: Rochelle Golden MD  April 24, 2023  12:30 PM

## 2023-04-24 NOTE — PHARMACY-ADMISSION MEDICATION HISTORY
Pharmacist Admission Medication History    Admission medication history is complete. The information provided in this note is only as accurate as the sources available at the time of the update.    Medication reconciliation/reorder completed by provider prior to medication history? No    Information Source(s): Patient and CareEverywhere/SureScripts via N/A    Pertinent Information: none    Changes made to PTA medication list:    Added: None    Deleted: None    Changed: None    Medication Affordability:       Allergies reviewed with patient and updates made in EHR: no    Medication History Completed By: Ariadna Patel PharmD 4/24/2023 1:53 PM    Prior to Admission medications    Medication Sig Last Dose Taking? Auth Provider Long Term End Date   Multiple Vitamin (MULTIVITAMIN OR) Take  by mouth. Past Week Yes Reported, Patient

## 2023-04-24 NOTE — PROGRESS NOTES
Admission/Transfer from: PACU  2 RN skin assessment completed.  Yes  Significant findings include: bruise on R knee, R side chest tube, center chest dressing CDI, L MILLY drain  WOC Nurse Consult Ordered? No  Completed with Tana FLANAGAN

## 2023-04-24 NOTE — ANESTHESIA PREPROCEDURE EVALUATION
Anesthesia Pre-Procedure Evaluation    Patient: Kellee Turk   MRN: [id number] : 2000        Procedure : Procedure(s):  REPAIR, PECTUS EXCAVATUM, sternal plating, cryoablation intercostal nerve.          History reviewed. No pertinent past medical history.   History reviewed. No pertinent surgical history.   Allergies   Allergen Reactions     Amoxicillin Shortness Of Breath and Rash     Watermelon Flavor       Social History     Tobacco Use     Smoking status: Never     Passive exposure: Never     Smokeless tobacco: Never   Vaping Use     Vaping status: Never Used   Substance Use Topics     Alcohol use: No     Alcohol/week: 0.0 standard drinks of alcohol      Wt Readings from Last 1 Encounters:   23 39.6 kg (87 lb 4.8 oz)        Anesthesia Evaluation   Pt has not had prior anesthetic         ROS/MED HX  ENT/Pulmonary: Comment:   SOB with exercise due to pectus      Neurologic:  - neg neurologic ROS     Cardiovascular:  - neg cardiovascular ROS   (+) -----Previous cardiac testing   Echo: Date: Results:    Stress Test: Date:  Results:  Negative for ischemia  ECG Reviewed: Date: Results:    Cath: Date: Results:      METS/Exercise Tolerance:     Hematologic:  - neg hematologic  ROS     Musculoskeletal: Comment:    Pectus excavatum      GI/Hepatic:  - neg GI/hepatic ROS     Renal/Genitourinary:  - neg Renal ROS     Endo:  - neg endo ROS     Psychiatric/Substance Use:  - neg psychiatric ROS     Infectious Disease:  - neg infectious disease ROS     Malignancy:  - neg malignancy ROS     Other:               OUTSIDE LABS:  CBC:   Lab Results   Component Value Date    WBC 5.1 2023    WBC 5.1 2016    HGB 13.3 2023    HGB 12.7 2016    HCT 37.4 2023    HCT 36.6 2016     2023     2016     BMP:   Lab Results   Component Value Date     2015    POTASSIUM 4.3 2015    CHLORIDE 106 2015    CO2 24 2015    BUN 5 (L) 2015     CR 0.52 11/05/2015    GLC 94 04/24/2023    GLC 88 11/05/2015     COAGS:   Lab Results   Component Value Date    INR 1.04 03/27/2023     POC:   Lab Results   Component Value Date    HCG Negative 03/27/2023     HEPATIC:   Lab Results   Component Value Date    ALBUMIN 4.2 07/13/2022    PROTTOTAL 6.8 03/14/2016    ALT 14 03/14/2016    AST 12 03/14/2016    GGT 10 03/14/2016    ALKPHOS 239 (H) 03/14/2016    BILITOTAL 0.5 03/14/2016     OTHER:   Lab Results   Component Value Date    CORINNA 9.4 11/05/2015    LIPASE 111 03/14/2016    TSH 1.66 03/14/2016    T4 1.09 03/14/2016    CRP <2.9 03/14/2016    SED 8 03/14/2016       Anesthesia Plan    ASA Status:  2   NPO Status:  NPO Appropriate    Anesthesia Type: General.     - Airway: ETT   Induction: Intravenous.   Maintenance: Inhalation.   Techniques and Equipment:     - Lines/Monitors: 2nd IV, BIS     Consents    Anesthesia Plan(s) and associated risks, benefits, and realistic alternatives discussed. Questions answered and patient/representative(s) expressed understanding.    - Discussed:     - Discussed with:  Patient         Postoperative Care    Pain management: IV analgesics, Oral pain medications.   PONV prophylaxis: Ondansetron (or other 5HT-3), Dexamethasone or Solumedrol     Comments:           H&P reviewed: Unable to attach H&P to encounter due to EHR limitations. H&P Update: appropriate H&P reviewed, patient examined. No interval changes since H&P (within 30 days).         Elizabeth Engel MD

## 2023-04-24 NOTE — OP NOTE
Procedure Date: 04/24/2023    PREOPERATIVE DIAGNOSIS:  History of severe pectus excavatum.    POSTOPERATIVE DIAGNOSIS:  History of severe pectus excavatum.    PROCEDURES PERFORMED:    1.  Modified Ravitch repair with sternal plating and retrosternal bar placement.  2.  Intercostal nerve cryoablation from spaces 2, 3, 4, and 5 bilaterally.     ANESTHESIA:  General with single-lumen tube.    SURGEON:  Bay King MD.    ASSISTANT:  Minh Lilly MD, Cardiothoracic Surgery Fellow    COMPLICATIONS:  None.    ESTIMATED BLOOD LOSS:  30 mL.    DRAINS AND TUBES:    1.  A 19-Luxembourgish pigtail to right pleural space directed anteroapical.  2.  A 19-Luxembourgish Eber drain underneath the pectoralis major muscle (left on skin).    SPECIMENS:  None.    IMPLANTS:  We used a sternal plate with a KLS Kasi system and 6 screws, 3 on either side of the sternotomy.    DESCRIPTION OF PROCEDURE IN DETAIL:  The patient was taken to the operating room, laid supine.  General anesthesia was induced.  The chest was prepped with ChloraPrep and draped in normal sterile fashion.  A formal timeout was carried out confirming the patient and correct procedure.  She had SCDs in place and functioning prior to induction of anesthesia.  She received antibiotics within 30 minutes of incision.    After the timeout was completed, we started by making a bell-shaped incision in the submammary crease.  We divided the pectoralis major muscle and we raised myocutaneous flaps superiorly and inferiorly.  The superior border of dissection was the 2nd intercostal space and the lower border of dissection was thus xiphoid process.  We then identified the cartilages to be removed.  We determined that the cartilage of the ribs 3, 4, and 5 would need to be removed bilaterally.  We created a subperiosteal resection of these cartilages.  We removed more cartilage on the right side given that the patient had asymmetric chest wall deformity and a right tilt of the  sternum.  After we performed the cartilage resection, we performed an intercostal nerve cryoablation of spaces 2, 3, 4, and 5 bilaterally.  Next, a wedge transverse sternotomy was made with an oscillating saw.  We then created a retrosternal tunnel and we advanced a chest tube behind the sternum to raise the bottom of the sternum to correct the chest wall deformity. Using the chest tube, we then slid a retrosternal bar.  We used a 16 cm long retrosternal bar, which was tailored to the patient's anatomy.  Once the bar was in place, we confirmed that there was no indentation on the skin.  The bar was secured to the rib on each side using a #1 PDS suture.  Lastly, we performed sternal plating.  We used the KLS Kasi system.  A sternal plate was chosen in a T shape.  We used 3 horizontal screws above the transverse sternotomy and 3 vertical screws below the sternotomy.  The patient had an excellent result and correction of the chest wall deformity.  We confirmed hemostasis.  We performed a leak test.  The patient had a very, very small air leak.  Therefore, we placed a right pleural tube using a pigtail catheter.  Lastly, we placed a 19-Bolivian Eber drain underneath the pectoralis major muscle, which was brought through a separate stab incision on the left side.  The incision was closed in multiple layers using absorbable suture.  We used tacking sutures from the pectoralis major muscle to the chest wall to minimize the dead space and prevent seroma formation.  The skin was closed with 4-0 Vicryl and we applied Exofin to the skin incision.  The patient tolerated the procedure well.  She was awakened, extubated in the operating room, and transferred to PACU for recovery.  All instrument and sponge counts were correct at the end of the case.    Bay King MD        D: 2023   T: 2023   MT: satnam    Name:     GILBERTO SOUSA  MRN:      [id number]        Account:        [id number]   :       2000           Procedure Date: 04/24/2023     Document: [id number]

## 2023-04-25 ENCOUNTER — APPOINTMENT (OUTPATIENT)
Dept: GENERAL RADIOLOGY | Facility: CLINIC | Age: 23
DRG: 517 | End: 2023-04-25
Attending: THORACIC SURGERY (CARDIOTHORACIC VASCULAR SURGERY)
Payer: COMMERCIAL

## 2023-04-25 LAB
ANION GAP SERPL CALCULATED.3IONS-SCNC: 9 MMOL/L (ref 7–15)
BUN SERPL-MCNC: 8.1 MG/DL (ref 6–20)
CALCIUM SERPL-MCNC: 8.7 MG/DL (ref 8.6–10)
CHLORIDE SERPL-SCNC: 104 MMOL/L (ref 98–107)
CREAT SERPL-MCNC: 0.76 MG/DL (ref 0.51–0.95)
DEPRECATED HCO3 PLAS-SCNC: 22 MMOL/L (ref 22–29)
ERYTHROCYTE [DISTWIDTH] IN BLOOD BY AUTOMATED COUNT: 11.7 % (ref 10–15)
GFR SERPL CREATININE-BSD FRML MDRD: >90 ML/MIN/1.73M2
GLUCOSE SERPL-MCNC: 117 MG/DL (ref 70–99)
HCT VFR BLD AUTO: 32.5 % (ref 35–47)
HGB BLD-MCNC: 11.2 G/DL (ref 11.7–15.7)
MCH RBC QN AUTO: 30.9 PG (ref 26.5–33)
MCHC RBC AUTO-ENTMCNC: 34.5 G/DL (ref 31.5–36.5)
MCV RBC AUTO: 90 FL (ref 78–100)
PLATELET # BLD AUTO: 194 10E3/UL (ref 150–450)
POTASSIUM SERPL-SCNC: 4.2 MMOL/L (ref 3.4–5.3)
RADIOLOGIST FLAGS: ABNORMAL
RBC # BLD AUTO: 3.62 10E6/UL (ref 3.8–5.2)
SODIUM SERPL-SCNC: 135 MMOL/L (ref 136–145)
WBC # BLD AUTO: 13.5 10E3/UL (ref 4–11)

## 2023-04-25 PROCEDURE — 250N000013 HC RX MED GY IP 250 OP 250 PS 637: Performed by: SURGERY

## 2023-04-25 PROCEDURE — 71045 X-RAY EXAM CHEST 1 VIEW: CPT

## 2023-04-25 PROCEDURE — 85027 COMPLETE CBC AUTOMATED: CPT | Performed by: STUDENT IN AN ORGANIZED HEALTH CARE EDUCATION/TRAINING PROGRAM

## 2023-04-25 PROCEDURE — 71045 X-RAY EXAM CHEST 1 VIEW: CPT | Mod: 77

## 2023-04-25 PROCEDURE — 80048 BASIC METABOLIC PNL TOTAL CA: CPT | Performed by: STUDENT IN AN ORGANIZED HEALTH CARE EDUCATION/TRAINING PROGRAM

## 2023-04-25 PROCEDURE — 250N000011 HC RX IP 250 OP 636: Performed by: SURGERY

## 2023-04-25 PROCEDURE — 71045 X-RAY EXAM CHEST 1 VIEW: CPT | Mod: 26 | Performed by: RADIOLOGY

## 2023-04-25 PROCEDURE — 120N000002 HC R&B MED SURG/OB UMMC

## 2023-04-25 PROCEDURE — 36415 COLL VENOUS BLD VENIPUNCTURE: CPT | Performed by: STUDENT IN AN ORGANIZED HEALTH CARE EDUCATION/TRAINING PROGRAM

## 2023-04-25 RX ADMIN — ACETAMINOPHEN 975 MG: 325 TABLET, FILM COATED ORAL at 06:09

## 2023-04-25 RX ADMIN — FAMOTIDINE 20 MG: 20 TABLET ORAL at 08:41

## 2023-04-25 RX ADMIN — PROCHLORPERAZINE MALEATE 10 MG: 5 TABLET ORAL at 08:41

## 2023-04-25 RX ADMIN — SENNOSIDES AND DOCUSATE SODIUM 1 TABLET: 8.6; 5 TABLET ORAL at 20:18

## 2023-04-25 RX ADMIN — ONDANSETRON 4 MG: 2 INJECTION INTRAMUSCULAR; INTRAVENOUS at 20:15

## 2023-04-25 RX ADMIN — ACETAMINOPHEN 975 MG: 325 TABLET, FILM COATED ORAL at 20:18

## 2023-04-25 RX ADMIN — OXYCODONE HYDROCHLORIDE 10 MG: 5 TABLET ORAL at 06:09

## 2023-04-25 RX ADMIN — GABAPENTIN 200 MG: 100 CAPSULE ORAL at 08:41

## 2023-04-25 RX ADMIN — GABAPENTIN 200 MG: 100 CAPSULE ORAL at 15:09

## 2023-04-25 RX ADMIN — ENOXAPARIN SODIUM 30 MG: 30 INJECTION SUBCUTANEOUS at 08:42

## 2023-04-25 RX ADMIN — METHOCARBAMOL 750 MG: 750 TABLET ORAL at 01:48

## 2023-04-25 RX ADMIN — ONDANSETRON 4 MG: 4 TABLET, ORALLY DISINTEGRATING ORAL at 12:29

## 2023-04-25 RX ADMIN — POLYETHYLENE GLYCOL 3350 17 G: 17 POWDER, FOR SOLUTION ORAL at 08:42

## 2023-04-25 RX ADMIN — METHOCARBAMOL 750 MG: 750 TABLET ORAL at 16:27

## 2023-04-25 RX ADMIN — SENNOSIDES AND DOCUSATE SODIUM 1 TABLET: 8.6; 5 TABLET ORAL at 08:42

## 2023-04-25 RX ADMIN — METHOCARBAMOL 750 MG: 750 TABLET ORAL at 08:42

## 2023-04-25 RX ADMIN — GABAPENTIN 200 MG: 100 CAPSULE ORAL at 20:18

## 2023-04-25 RX ADMIN — ACETAMINOPHEN 975 MG: 325 TABLET, FILM COATED ORAL at 12:29

## 2023-04-25 RX ADMIN — FAMOTIDINE 20 MG: 20 TABLET ORAL at 20:18

## 2023-04-25 RX ADMIN — ONDANSETRON 4 MG: 2 INJECTION INTRAMUSCULAR; INTRAVENOUS at 06:09

## 2023-04-25 RX ADMIN — PROCHLORPERAZINE EDISYLATE 10 MG: 5 INJECTION INTRAMUSCULAR; INTRAVENOUS at 01:48

## 2023-04-25 ASSESSMENT — ACTIVITIES OF DAILY LIVING (ADL)
ADLS_ACUITY_SCORE: 28
ADLS_ACUITY_SCORE: 24
ADLS_ACUITY_SCORE: 28

## 2023-04-25 NOTE — PLAN OF CARE
Goal Outcome Evaluation:      Plan of Care Reviewed With: patient    Overall Patient Progress: improving    Outcome Evaluation: 1388-3349  VSS on RA, capno in place overnight.  A&Ox4, anxious & wanted mom to stay the night but was okay with her leaving after reassurance from RN.  Rounding q1h. Calling appropriately.  R&L PIV SL.  C/o chest pain from incision site & CT, tolerable w/ PRN tylenol, robaxin, & PO oxy.  N/V controlled w/ essential oils, sea bands & PRN IV zofran / IV compazine.  No emesis this shift.  R CT to water seal had 5ml serosanguinous OP.  Air leak noted this shift, resolved after drsg change & luer lock tightened.  CXR this AM.  L MILLY drain had 80 ml total OP, bloody bright red.  Surgical incision chest primapore drsg CDI.  Up SBA to bathroom.  LBM 4/23 per pt report.  Tolerating fluids and had 1 jello.

## 2023-04-25 NOTE — PLAN OF CARE
Goal Outcome Evaluation:      Plan of Care Reviewed With: patient, family    Overall Patient Progress: improvingOverall Patient Progress: improving    Outcome Evaluation: CT to -20cm d/t incr pneumothorax with mininimal output. Pt is lethargic, hard to motivate. Ambulated  the bedoya x 2.  Advanced diet, very poor appetite.  Nausea when up despite compazine and zofran, finally improving at 1600. MILLY site leaky, dressing changed.  Surgical dressing changed. Reports pain 6/10, backing off oxycodone d/t lethargy. Pain remains 6/10 with robaxin, tylenol and gabapentin which pt can tolerate.

## 2023-04-25 NOTE — PLAN OF CARE
Goal Outcome Evaluation:      Plan of Care Reviewed With: patient, parent    Overall Patient Progress: no changeOverall Patient Progress: no change    Outcome Evaluation: Arrived on 5A  very drowsy from procedure. A&Ox4 once she woke up. VSS on room air. parents at bedside. chest dressing CDI. R chest tube to water seal. L side MILLY drain, 50 ml output. did not get OOB this shift. Capno in use. able to make needs known. nausea this shift, emesis x1. given PRN zofran and compazine x1. aromatherapy, sea bands in use.

## 2023-04-25 NOTE — PROGRESS NOTES
THORACIC & FOREGUT SURGERY    S:  No acute overnight events.  Pt seen at bedside resting comfortably.       O:  Vitals:    04/25/23 0603 04/25/23 0900 04/25/23 0930 04/25/23 1203   BP: 104/55      BP Location: Right arm      Patient Position: Semi-De's      Cuff Size: Adult Regular      Pulse: 74      Resp: 14  12    Temp: 98.5  F (36.9  C)      TempSrc: Oral      SpO2: 93% (!) 89% 92% 97%   Weight:       Height:           A&O, NAD  Breathing non-labored  Noncyanotic  Nondistended  Distal extremities warm     Chest x-ray this a.m. showed moderate right pneumothorax, follow-up imaging today with near resolution and only small right apical pneumothorax remaining    A/P: Kellee Turk is a 23 year old female with h/o severe pectus excavatum now POD#1 s/p modified Ravitch repair with sternal plating and retrosternal bar placement.  Doing well.    -Oral oxycodone with IV Dilaudid backup for pain  -Daily chest x-rays  -Likely discharge in 1 to 2 days if pain control remains adequate  -Lovenox DVT prophylaxis    POSTOP COMPLICATIONS: None    Daryl Rosenthal PA-C

## 2023-04-26 ENCOUNTER — APPOINTMENT (OUTPATIENT)
Dept: GENERAL RADIOLOGY | Facility: CLINIC | Age: 23
DRG: 517 | End: 2023-04-26
Attending: THORACIC SURGERY (CARDIOTHORACIC VASCULAR SURGERY)
Payer: COMMERCIAL

## 2023-04-26 LAB
ANION GAP SERPL CALCULATED.3IONS-SCNC: 9 MMOL/L (ref 7–15)
BUN SERPL-MCNC: 11.6 MG/DL (ref 6–20)
CALCIUM SERPL-MCNC: 9.3 MG/DL (ref 8.6–10)
CHLORIDE SERPL-SCNC: 102 MMOL/L (ref 98–107)
CREAT SERPL-MCNC: 0.82 MG/DL (ref 0.51–0.95)
DEPRECATED HCO3 PLAS-SCNC: 26 MMOL/L (ref 22–29)
ERYTHROCYTE [DISTWIDTH] IN BLOOD BY AUTOMATED COUNT: 11.9 % (ref 10–15)
GFR SERPL CREATININE-BSD FRML MDRD: >90 ML/MIN/1.73M2
GLUCOSE SERPL-MCNC: 103 MG/DL (ref 70–99)
HCT VFR BLD AUTO: 36.3 % (ref 35–47)
HGB BLD-MCNC: 12.4 G/DL (ref 11.7–15.7)
MCH RBC QN AUTO: 31.1 PG (ref 26.5–33)
MCHC RBC AUTO-ENTMCNC: 34.2 G/DL (ref 31.5–36.5)
MCV RBC AUTO: 91 FL (ref 78–100)
PLATELET # BLD AUTO: 173 10E3/UL (ref 150–450)
POTASSIUM SERPL-SCNC: 3.9 MMOL/L (ref 3.4–5.3)
RBC # BLD AUTO: 3.99 10E6/UL (ref 3.8–5.2)
SODIUM SERPL-SCNC: 137 MMOL/L (ref 136–145)
WBC # BLD AUTO: 9.8 10E3/UL (ref 4–11)

## 2023-04-26 PROCEDURE — 250N000011 HC RX IP 250 OP 636: Performed by: SURGERY

## 2023-04-26 PROCEDURE — 71045 X-RAY EXAM CHEST 1 VIEW: CPT | Mod: 26 | Performed by: RADIOLOGY

## 2023-04-26 PROCEDURE — 250N000011 HC RX IP 250 OP 636

## 2023-04-26 PROCEDURE — 250N000013 HC RX MED GY IP 250 OP 250 PS 637: Performed by: SURGERY

## 2023-04-26 PROCEDURE — 85027 COMPLETE CBC AUTOMATED: CPT | Performed by: STUDENT IN AN ORGANIZED HEALTH CARE EDUCATION/TRAINING PROGRAM

## 2023-04-26 PROCEDURE — 250N000013 HC RX MED GY IP 250 OP 250 PS 637

## 2023-04-26 PROCEDURE — 71045 X-RAY EXAM CHEST 1 VIEW: CPT | Mod: 77

## 2023-04-26 PROCEDURE — 120N000002 HC R&B MED SURG/OB UMMC

## 2023-04-26 PROCEDURE — 80048 BASIC METABOLIC PNL TOTAL CA: CPT | Performed by: STUDENT IN AN ORGANIZED HEALTH CARE EDUCATION/TRAINING PROGRAM

## 2023-04-26 PROCEDURE — 36415 COLL VENOUS BLD VENIPUNCTURE: CPT | Performed by: STUDENT IN AN ORGANIZED HEALTH CARE EDUCATION/TRAINING PROGRAM

## 2023-04-26 PROCEDURE — 71045 X-RAY EXAM CHEST 1 VIEW: CPT

## 2023-04-26 PROCEDURE — 85049 AUTOMATED PLATELET COUNT: CPT | Performed by: STUDENT IN AN ORGANIZED HEALTH CARE EDUCATION/TRAINING PROGRAM

## 2023-04-26 RX ORDER — HYDROXYZINE HYDROCHLORIDE 50 MG/1
50 TABLET, FILM COATED ORAL EVERY 6 HOURS PRN
Status: DISCONTINUED | OUTPATIENT
Start: 2023-04-26 | End: 2023-04-27 | Stop reason: HOSPADM

## 2023-04-26 RX ORDER — LIDOCAINE 4 G/G
2 PATCH TOPICAL
Status: DISCONTINUED | OUTPATIENT
Start: 2023-04-26 | End: 2023-04-27 | Stop reason: HOSPADM

## 2023-04-26 RX ORDER — HYDROXYZINE HYDROCHLORIDE 25 MG/1
25 TABLET, FILM COATED ORAL EVERY 6 HOURS PRN
Status: DISCONTINUED | OUTPATIENT
Start: 2023-04-26 | End: 2023-04-27 | Stop reason: HOSPADM

## 2023-04-26 RX ORDER — METHOCARBAMOL 100 MG/ML
500 INJECTION, SOLUTION INTRAMUSCULAR; INTRAVENOUS ONCE
Status: COMPLETED | OUTPATIENT
Start: 2023-04-26 | End: 2023-04-26

## 2023-04-26 RX ADMIN — OXYCODONE HYDROCHLORIDE 5 MG: 5 TABLET ORAL at 23:48

## 2023-04-26 RX ADMIN — ACETAMINOPHEN 975 MG: 325 TABLET, FILM COATED ORAL at 20:01

## 2023-04-26 RX ADMIN — POLYETHYLENE GLYCOL 3350 17 G: 17 POWDER, FOR SOLUTION ORAL at 08:28

## 2023-04-26 RX ADMIN — SENNOSIDES AND DOCUSATE SODIUM 1 TABLET: 8.6; 5 TABLET ORAL at 19:04

## 2023-04-26 RX ADMIN — METHOCARBAMOL 500 MG: 100 INJECTION, SOLUTION INTRAMUSCULAR; INTRAVENOUS at 21:35

## 2023-04-26 RX ADMIN — ACETAMINOPHEN 975 MG: 325 TABLET, FILM COATED ORAL at 05:12

## 2023-04-26 RX ADMIN — LIDOCAINE PATCH 4% 2 PATCH: 40 PATCH TOPICAL at 21:19

## 2023-04-26 RX ADMIN — OXYCODONE HYDROCHLORIDE 5 MG: 5 TABLET ORAL at 06:12

## 2023-04-26 RX ADMIN — GABAPENTIN 200 MG: 100 CAPSULE ORAL at 08:28

## 2023-04-26 RX ADMIN — GABAPENTIN 200 MG: 100 CAPSULE ORAL at 14:46

## 2023-04-26 RX ADMIN — GABAPENTIN 200 MG: 100 CAPSULE ORAL at 19:04

## 2023-04-26 RX ADMIN — ACETAMINOPHEN 650 MG: 325 TABLET ORAL at 23:48

## 2023-04-26 RX ADMIN — OXYCODONE HYDROCHLORIDE 5 MG: 5 TABLET ORAL at 20:01

## 2023-04-26 RX ADMIN — OXYCODONE HYDROCHLORIDE 5 MG: 5 TABLET ORAL at 17:44

## 2023-04-26 RX ADMIN — METHOCARBAMOL 750 MG: 750 TABLET ORAL at 09:49

## 2023-04-26 RX ADMIN — ONDANSETRON 4 MG: 4 TABLET, ORALLY DISINTEGRATING ORAL at 09:49

## 2023-04-26 RX ADMIN — ONDANSETRON 4 MG: 2 INJECTION INTRAMUSCULAR; INTRAVENOUS at 20:10

## 2023-04-26 RX ADMIN — OXYCODONE HYDROCHLORIDE 5 MG: 5 TABLET ORAL at 12:41

## 2023-04-26 RX ADMIN — FAMOTIDINE 20 MG: 20 TABLET ORAL at 19:04

## 2023-04-26 RX ADMIN — ACETAMINOPHEN 975 MG: 325 TABLET, FILM COATED ORAL at 12:41

## 2023-04-26 RX ADMIN — FAMOTIDINE 20 MG: 20 TABLET ORAL at 08:28

## 2023-04-26 RX ADMIN — HYDROXYZINE HYDROCHLORIDE 25 MG: 25 TABLET, FILM COATED ORAL at 21:20

## 2023-04-26 RX ADMIN — SENNOSIDES AND DOCUSATE SODIUM 1 TABLET: 8.6; 5 TABLET ORAL at 08:28

## 2023-04-26 RX ADMIN — ENOXAPARIN SODIUM 30 MG: 30 INJECTION SUBCUTANEOUS at 08:28

## 2023-04-26 RX ADMIN — METHOCARBAMOL 750 MG: 750 TABLET ORAL at 15:57

## 2023-04-26 ASSESSMENT — ACTIVITIES OF DAILY LIVING (ADL)
ADLS_ACUITY_SCORE: 28
ADLS_ACUITY_SCORE: 29
ADLS_ACUITY_SCORE: 29
ADLS_ACUITY_SCORE: 28
ADLS_ACUITY_SCORE: 28
ADLS_ACUITY_SCORE: 29
ADLS_ACUITY_SCORE: 28
ADLS_ACUITY_SCORE: 29
ADLS_ACUITY_SCORE: 28

## 2023-04-26 NOTE — PLAN OF CARE
Goal Outcome Evaluation:      Plan of Care Reviewed With: patient    Overall Patient Progress: no change    Outcome Evaluation: 1150-6868 A&Ox4, withdrawn.  Family at bedside during the evening, encouraging food & fluids.  C/o nausea and chest pain, tolerable w/ PRN IV zofran & tylenol.  No emesis this shift. Chest drsg CDI.  R CT to -20 cm sution, had 5ml OP this shift.  L MILLY drain to bulb suction no OP.  Encouraged a walk but pt declined.  Up SBA.  No BM this shift.  VSS on RA.

## 2023-04-26 NOTE — PLAN OF CARE
Goal Outcome Evaluation:      Plan of Care Reviewed With: patient    Overall Patient Progress: improvingOverall Patient Progress: improving    Outcome Evaluation: Pt is alert and oriented. Up with SBA to manage lines. Chest tube to water seal. Maintains oxygen saturation on room air. Ambulated the halls greater than 500 ft x 3. generally reports pain 6-7/10, 5mg  oxy given x1, robaxin given x 2. Reports some dizziness after oxy. Appetite improving but still pretty poor. Voids without issue. No BM since before surgery. Senna and miralax given. MILLY drain with serosanguinous output. Incision well aproximated with no drainage. Dressing changed.

## 2023-04-26 NOTE — PLAN OF CARE
Goal Outcome Evaluation:      Plan of Care Reviewed With: patient    Overall Patient Progress: no changeOverall Patient Progress: no change    Outcome Evaluation: A&Ox4, VSS on RA. Ambulated to commode x1 during shift, SBA with no BM. CAPNO monitoring in place. Chest dressing CDI. R CT -20 cm suction OP 12 mL, serosanguinous. MILLY drain OP 10 mL, bright bloody red. PRN oxy x1 at 0600 for 7/10 chest drain/incision pain.

## 2023-04-27 ENCOUNTER — APPOINTMENT (OUTPATIENT)
Dept: GENERAL RADIOLOGY | Facility: CLINIC | Age: 23
DRG: 517 | End: 2023-04-27
Attending: THORACIC SURGERY (CARDIOTHORACIC VASCULAR SURGERY)
Payer: COMMERCIAL

## 2023-04-27 ENCOUNTER — APPOINTMENT (OUTPATIENT)
Dept: GENERAL RADIOLOGY | Facility: CLINIC | Age: 23
DRG: 517 | End: 2023-04-27
Attending: PHYSICIAN ASSISTANT
Payer: COMMERCIAL

## 2023-04-27 VITALS
DIASTOLIC BLOOD PRESSURE: 82 MMHG | TEMPERATURE: 98.5 F | RESPIRATION RATE: 14 BRPM | SYSTOLIC BLOOD PRESSURE: 122 MMHG | WEIGHT: 87.3 LBS | OXYGEN SATURATION: 98 % | HEART RATE: 98 BPM | BODY MASS INDEX: 14.03 KG/M2 | HEIGHT: 66 IN

## 2023-04-27 PROCEDURE — 250N000011 HC RX IP 250 OP 636

## 2023-04-27 PROCEDURE — 250N000011 HC RX IP 250 OP 636: Performed by: SURGERY

## 2023-04-27 PROCEDURE — 71045 X-RAY EXAM CHEST 1 VIEW: CPT | Mod: 77

## 2023-04-27 PROCEDURE — 71045 X-RAY EXAM CHEST 1 VIEW: CPT | Mod: 26 | Performed by: RADIOLOGY

## 2023-04-27 PROCEDURE — 250N000013 HC RX MED GY IP 250 OP 250 PS 637: Performed by: SURGERY

## 2023-04-27 PROCEDURE — 71045 X-RAY EXAM CHEST 1 VIEW: CPT

## 2023-04-27 RX ORDER — GABAPENTIN 300 MG/1
300 CAPSULE ORAL 2 TIMES DAILY
Qty: 120 CAPSULE | Refills: 0 | Status: SHIPPED | OUTPATIENT
Start: 2023-04-27 | End: 2024-05-01

## 2023-04-27 RX ORDER — KETOROLAC TROMETHAMINE 15 MG/ML
15 INJECTION, SOLUTION INTRAMUSCULAR; INTRAVENOUS EVERY 6 HOURS PRN
Status: DISCONTINUED | OUTPATIENT
Start: 2023-04-27 | End: 2023-04-27 | Stop reason: HOSPADM

## 2023-04-27 RX ORDER — POLYETHYLENE GLYCOL 3350 17 G/17G
17 POWDER, FOR SOLUTION ORAL DAILY
Qty: 7 PACKET | Refills: 0 | Status: SHIPPED | OUTPATIENT
Start: 2023-04-28 | End: 2023-06-01

## 2023-04-27 RX ORDER — ACETAMINOPHEN 325 MG/1
650 TABLET ORAL EVERY 4 HOURS PRN
COMMUNITY
Start: 2023-04-27 | End: 2024-05-01

## 2023-04-27 RX ORDER — OXYCODONE HYDROCHLORIDE 5 MG/1
5 TABLET ORAL EVERY 4 HOURS PRN
Qty: 40 TABLET | Refills: 0 | Status: SHIPPED | OUTPATIENT
Start: 2023-04-27 | End: 2023-06-01

## 2023-04-27 RX ORDER — AMOXICILLIN 250 MG
1 CAPSULE ORAL 2 TIMES DAILY
Qty: 14 TABLET | Refills: 0 | Status: SHIPPED | OUTPATIENT
Start: 2023-04-27 | End: 2023-06-01

## 2023-04-27 RX ORDER — METHOCARBAMOL 750 MG/1
750 TABLET, FILM COATED ORAL EVERY 6 HOURS PRN
Qty: 40 TABLET | Refills: 0 | Status: SHIPPED | OUTPATIENT
Start: 2023-04-27 | End: 2023-05-18

## 2023-04-27 RX ADMIN — ENOXAPARIN SODIUM 30 MG: 30 INJECTION SUBCUTANEOUS at 08:16

## 2023-04-27 RX ADMIN — FAMOTIDINE 20 MG: 20 TABLET ORAL at 08:16

## 2023-04-27 RX ADMIN — ACETAMINOPHEN 975 MG: 325 TABLET, FILM COATED ORAL at 05:15

## 2023-04-27 RX ADMIN — KETOROLAC TROMETHAMINE 15 MG: 15 INJECTION, SOLUTION INTRAMUSCULAR; INTRAVENOUS at 02:21

## 2023-04-27 RX ADMIN — SENNOSIDES AND DOCUSATE SODIUM 1 TABLET: 8.6; 5 TABLET ORAL at 08:16

## 2023-04-27 RX ADMIN — METHOCARBAMOL 750 MG: 750 TABLET ORAL at 03:05

## 2023-04-27 RX ADMIN — GABAPENTIN 200 MG: 100 CAPSULE ORAL at 08:16

## 2023-04-27 RX ADMIN — POLYETHYLENE GLYCOL 3350 17 G: 17 POWDER, FOR SOLUTION ORAL at 08:16

## 2023-04-27 ASSESSMENT — ACTIVITIES OF DAILY LIVING (ADL)
ADLS_ACUITY_SCORE: 29
ADLS_ACUITY_SCORE: 31
ADLS_ACUITY_SCORE: 31
ADLS_ACUITY_SCORE: 29

## 2023-04-27 NOTE — DISCHARGE SUMMARY
NAME: Kellee Turk   MRN: [id number]   : 2000     DATE OF ADMISSION: 2023     PRE/POSTOPERATIVE DIAGNOSES: History of severe pectus excavatum    PROCEDURES PERFORMED:   1.  Modified Ravitch repair with sternal plating and retrosternal bar placement.  2.  Intercostal nerve cryoablation from spaces 2, 3, 4, and 5 bilaterally.    CODING ADDENDUM:  Patient's BMI is clinically Insignificant    PATHOLOGY RESULTS: None    CULTURE RESULTS: None     INTRAOPERATIVE COMPLICATIONS: None     POSTOPERATIVE MEDICAL ISSUES: None     DRAINS/TUBES PRESENT AT DISCHARGE: dressing changes    DATE OF DISCHARGE:  2023    HOSPITAL COURSE: Kellee Turk is a 23 year old female who on 2023 underwent the above-named procedures. She tolerated the operation well and postoperatively was transferred to the general post-surgical unit.  The remainder of her course was essentially uncomplicated.  Prior to discharge, her pain was controlled well, she was able to perform ADLs and ambulate independently without difficulty, and had full return of bladder function.  On , she was discharged home in stable condition.    DISCHARGE EXAM:   A&O, NAD  Resp non-labored  Distal extremities warm    Incisions CDI  Chest tube sites without concern     DISCHARGE INSTRUCTIONS:  Discharge Procedure Orders   X-ray Chest 2 vws*   Standing Status: Future Standing Exp. Date: 23   Order Comments: For Thoracic Surgery follow up appointment only     Order Specific Question Answer Comments   Priority Routine      Reason for your hospital stay   Order Comments: Surgery     Activity   Order Comments: Your activity upon discharge: No lifting, pushing, or pulling more than 10 lbs for 2 months     Order Specific Question Answer Comments   Is discharge order? Yes      Discharge Instructions   Order Comments: THORACIC SURGERY DISCHARGE INSTRUCTIONS    DIET: Regular diet - as prior to admission     If your plans upon discharge include prolonged  "periods of sitting (i.e a lengthy car or plane ride), it is highly beneficial to get up and walk at least once per hour to help prevent swelling and blood clots.     You may remove chest tube dressing 48 hours after tube removal and bandage the site at your own discretion thereafter.  Small amounts of leakage are normal for 2-3 days after removal.  Feel free to call with questions.    You may get incision wet 2 days after operation. Do not submerge, soak, or scrub incision or swim until seen in follow-up.    Take incentive spirometer home for continued frequent use    Activity as tolerated, no strenous activity until seen in follow-up, no lifting greater than 10 pounds for the next 2 months.    Stay hydrated. Take over the counter fiber (metamucil or benefiber) and stool softeners (Miralax, docusate or senna) if becoming constipated.     Call for fever greater than 101.5, chills, increased size of incision, red skin around incision, vision changes, muscle strength changes, sensation changes, shortness of breath, or other concerns.    No driving while taking narcotic pain medication.    Transition to ibuprofen or tylenol/acetaminophen for pain control. Do not take tylenol/acetaminophen and acetaminophen containing narcotic (e.g., percocet or vicodin) at the same time. If you have known ulcer problems, or kidney trouble (elevated creatinine) do not take the ibuprofen.    In emergencies, call 911    For other Questions or Concerns;   A.) During weekday working hours (Monday through Friday 8am to 4:30pm)   call [phone] and ask to speak to a thoracic surgery nurse (RN or LPN).     B.) At nights (after 4:30pm), on weekends, or if urgent call [phone] and   tell the  \"I would like to page job code [phone], the thoracic surgery   fellow on call, please.\"     Adult [facility] Follow-up and recommended labs and tests   Order Comments: 1.) Follow up with primary care physician, Kelsey Giordano " Mely in 1-2 weeks.  2.) Follow up with Tahira Henderson, Clinical Nurse Specialist, in Thoracic Surgery clinic in 1 month, prior to which a CXR should be performed (CXR should be on the same day as clinic appointment).   3.) Follow up with Dr. Bay Wood in Thoracic Surgery clinic in 2 months, no imaging needed.       Appointments on [place] and/or [facility] (with [facility] or [facility] provider or service). Call [phone] if you haven't heard regarding these appointments within 7 days of discharge.     Diet   Order Comments: Follow this diet upon discharge: Orders Placed This Encounter      Snacks/Supplements Adult: Other; Please send ensure plus with bkf (vanilla), strawberry with lunch and strawberry with dinnert daily; With Meals      Snacks/Supplements Adult: Other; snacks: 2:00 pm: string cheese x2 ,,,, HS: strawberry yogurt; Between Meals      High Kcal/High Protein Diet, ADULT     Order Specific Question Answer Comments   Is discharge order? Yes        DISCHARGE MEDICATIONS:   Current Discharge Medication List      START taking these medications    Details   acetaminophen (TYLENOL) 325 MG tablet Take 2 tablets (650 mg) by mouth every 4 hours as needed for other (For optimal non-opioid multimodal pain management to improve pain control.)    Associated Diagnoses: Pectus excavatum      gabapentin (NEURONTIN) 300 MG capsule Take 1 capsule (300 mg) by mouth 2 times daily for 60 days  Qty: 120 capsule, Refills: 0    Associated Diagnoses: Pectus excavatum      methocarbamol (ROBAXIN) 750 MG tablet Take 1 tablet (750 mg) by mouth every 6 hours as needed for muscle spasms  Qty: 40 tablet, Refills: 0    Associated Diagnoses: Pectus excavatum      oxyCODONE (ROXICODONE) 5 MG tablet Take 1 tablet (5 mg) by mouth every 4 hours as needed for moderate pain  Qty: 40 tablet, Refills: 0    Associated Diagnoses: Pectus excavatum      polyethylene glycol (MIRALAX) 17 g packet Take 17 g by mouth daily  Qty: 7 packet,  Refills: 0    Associated Diagnoses: Pectus excavatum      senna-docusate (SENOKOT-S/PERICOLACE) 8.6-50 MG tablet Take 1 tablet by mouth 2 times daily  Qty: 14 tablet, Refills: 0    Associated Diagnoses: Pectus excavatum         CONTINUE these medications which have NOT CHANGED    Details   Multiple Vitamin (MULTIVITAMIN OR) Take  by mouth.

## 2023-04-27 NOTE — PLAN OF CARE
Goal Outcome Evaluation:      Plan of Care Reviewed With: patient    Overall Patient Progress: no change     Outcome Evaluation: 1293-7768     A&Ox4.  VSS on RA.  Pt had episode of increased chest pressure and difficulty breathing, see charge note.  Pain and difficulty breathing resolved after 1x dose IV robaxin and PRN atarax.  Lidocaine patch x2 applied to chest as well, pt is unsure if they make a difference.  PRN oxy 5mg given x1.  Pt declines IV dilaudid dt it causing her N/V.  Pt c/o of some N/V after finishing dinner w/ scant emesis, relief after PRN IV zofran.  Medial chest incision drsg CDI.  L MILLY drain bloody OP 10 ml.  R CT to water seal, 11ml OP this shift.  Up SBA to commode.  No BM since prior to surgery.  Continue to monitor & follow POC.         Spontaneous

## 2023-04-27 NOTE — PROVIDER NOTIFICATION
"Pt called with increased pain/pressure across chest, on assessment pt reports pressure has been there but has been getting worse.  Vitals are stable on RA but pt reports difficulty taking a deep breath due to pressure.  Dr. Jason with thoracic surgery paged to assess, after assessment new pain medication orders to be placed.  Chest tube remains to water seal.  Pt and parents in agreement with current plan, bedside RN Meryl also updated.  Will continue to monitor     /82 (BP Location: Left arm)   Pulse 89   Temp 97.6  F (36.4  C) (Oral)   Resp 14   Ht 1.676 m (5' 6\")   Wt 39.6 kg (87 lb 4.8 oz)   LMP 04/17/2023   SpO2 99%   BMI 14.09 kg/m       "

## 2023-04-27 NOTE — PLAN OF CARE
Goal Outcome Evaluation:      Plan of Care Reviewed With: patient    Overall Patient Progress: improvingOverall Patient Progress: improving    Outcome Evaluation: A&Ox4. VSS on room air ex tachycardia. chest tube and drain both removed this AM by team. pain at incision site. continuous pulse ox. denies nausea. up SBA.    Discharged to home with parents

## 2023-04-27 NOTE — PLAN OF CARE
"Goal Outcome Evaluation:      Plan of Care Reviewed With: patient    Overall Patient Progress: improving    Outcome Evaluation: Patient is alert and oriented x4. VSS On room air except tachycardic. Denies SOB. Complains of pain/pressure and soreness to incisional site and \"where the plates are\". Thoracic surgery paged for uncontrolled pain and team added toradol to pain regimen. Pain was 8/10 and patient received IV toradol and oral robaxin and scheduled tylenol. Patient verbalizes pain has improved and is now the lowest it has been at a 4/10. Cont. pulse ox in place. Chest tube to water seal with no output since last marked on previous shift. MILLY drain had less than 1 mL output this shift. Denies nausea. Patient is drinking Boost protein shakes for nutrition. Tolerating diet. SBA to commode, voiding spontaneously and No BM this shift. Chest XRAY completed this AM. Possible chest tube removal 4/27. Continue with POC.      "

## 2023-04-27 NOTE — PROGRESS NOTES
Cross-cover note: 11:58 PM 4/26/2023 04/26/2023    General Surgery Cross Cover Note    Called by nursing regarding: Pt w/ increased chest pain that is expanding across entire chest. New for her, oxycodone not helping.     Per patient: Reports that this pain is new and that her chest feels tense and that most of the pain spans the length of her incision. Denies any SOB and attributes some shallow breathing to pleuritic pain.     B/P: 117/82, T: 97.6, P: 89, R: 14    PE:  Uncomfortable, sitting up in bed, NAD  Breathing non-labored on RA  Tender to palpation across the length of her incision.     Plan:  Pt appears to be having muscle spasms related to the location of her incision. She has been saturating well on RA at 99% so this does not appear to be related to her chest tube being changed to waterseal, which happened earlier in the day. No indication for EKG as VSS, and pain appears to be incisional. Added one time IV robaxin for rapid relief and added atarax in addition to lidocaine patches to further optimize pain control.     Please page resident on call,     Alexy Jason MD  Surgical Resident

## 2023-05-04 ENCOUNTER — OFFICE VISIT (OUTPATIENT)
Dept: PEDIATRICS | Facility: CLINIC | Age: 23
End: 2023-05-04
Payer: COMMERCIAL

## 2023-05-04 VITALS
HEART RATE: 102 BPM | OXYGEN SATURATION: 97 % | TEMPERATURE: 97.8 F | DIASTOLIC BLOOD PRESSURE: 76 MMHG | RESPIRATION RATE: 24 BRPM | HEIGHT: 66 IN | SYSTOLIC BLOOD PRESSURE: 117 MMHG | BODY MASS INDEX: 13.82 KG/M2 | WEIGHT: 86 LBS

## 2023-05-04 DIAGNOSIS — Z98.890 POSTOPERATIVE STATE: Primary | ICD-10-CM

## 2023-05-04 PROCEDURE — 99213 OFFICE O/P EST LOW 20 MIN: CPT | Performed by: PEDIATRICS

## 2023-05-04 ASSESSMENT — PAIN SCALES - GENERAL: PAINLEVEL: NO PAIN (0)

## 2023-05-04 NOTE — PROGRESS NOTES
"  Assessment & Plan     Postoperative state  Doing well post op  hgb 11.2 - no symptomatic probably from blood loss from surgery but should be able to recover on her own, recommend eating iron rich foods or starting iron otc and recheck in 3 months  Reviewed signs of uti, along with signs of wound infection but since 1 week out unlikely at this time       MD MEREDITH [facility]   Kellee is a 23 year old, presenting for the following health issues:  Surgical Followup        5/4/2023     8:50 AM   Additional Questions   Roomed by geovanna   Accompanied by mom and dad         5/4/2023     8:50 AM   Patient Reported Additional Medications   Patient reports taking the following new medications see chart     HPI   Doing well post operatively, no sob, has been walking around does feel a pressure like pain when she lays flat, pain is well controlled, rarely takes oxycodone since it cause her to be nauseaus, no drainage from incision site, no fever  Appetite has been improving  No constipation  No signs of a uti but does have an inguinal lymph node   On right side that comes and goes, but no increased urinary frequency, no urgency, no pain with urination, denies any hair removal    hgb 11.2      Objective    /76   Pulse 102   Temp 97.8  F (36.6  C) (Oral)   Resp 24   Ht 1.683 m (5' 6.25\")   Wt 39 kg (86 lb)   LMP 04/17/2023   SpO2 97%   BMI 13.78 kg/m    Body mass index is 13.78 kg/m .  Physical Exam   GENERAL: healthy, alert and no distress  NECK: no adenopathy, no asymmetry, masses, or scars and thyroid normal to palpation  RESP: lungs clear to auscultation - no rales, rhonchi or wheezes  CV: regular rate and rhythm, normal S1 S2, no S3 or S4, no murmur, click or rub, no peripheral edema and peripheral pulses strong  ABDOMEN: soft, nontender, no hepatosplenomegaly, no masses and bowel sounds normal  SKIN: incision c/d/i     "

## 2023-05-11 ENCOUNTER — TELEPHONE (OUTPATIENT)
Dept: SURGERY | Facility: CLINIC | Age: 23
End: 2023-05-11
Payer: COMMERCIAL

## 2023-05-11 NOTE — TELEPHONE ENCOUNTER
"Patient and her mom called in this am with patient complaining of pain in her chest which is located centrally \"by the plate\" and radiates up to her neck, and she reports a pulsating feeling as well that is painful. Pain is 5-6/10 in severity. It started yesterday am when she woke up. She has been taking gabapentin 300mg BID, robaxin 750 once a day, and tylenol 1000mg TID. Has not taken oxycodone as it makes her vomit. She states that this helped with the pain, but she has still had pulsating sensation. Pulsating sensation worsens when she takes breath in, and feels like It pulsates more.   She denies shortness of breath, chest pain, fever, chills, nausea, vomiting, dizziness, lightheadedness    She also reports she went to the [facility] ED where they did vitals, labs, and CXR, and she reported that they felt she was fine and this was likely muscular in nature.     Very reassuring that she was seen in the ED with negative workup. Agree that this does sound muscular in nature. Would recommend at this time that she increase robaxin frequency to 3x/day, as well as add ibuprofen to her pain regimen and assess whether pain has improved. Discussed with patient that if this is not helping, she may call into thoracic surgery clinic later today to further discuss issue.     Sadiq Gamez MD   Surgery PGY2    "

## 2023-05-18 DIAGNOSIS — Q67.6 PECTUS EXCAVATUM: ICD-10-CM

## 2023-05-18 RX ORDER — METHOCARBAMOL 750 MG/1
750 TABLET, FILM COATED ORAL EVERY 6 HOURS PRN
Qty: 40 TABLET | Refills: 0 | Status: SHIPPED | OUTPATIENT
Start: 2023-05-18 | End: 2024-05-01

## 2023-05-18 NOTE — TELEPHONE ENCOUNTER
Methocarbamol refill   Last prescribing provider: Roby Finney after surgery prescription     Last clinic visit date: 4/24/23 Dr Wood surgery    Follow up appt: 6/1/23 Tahira Henderson   Recommendations for requested medication (if none, N/A): 4/27/23 Discharge summary   methocarbamol (ROBAXIN) 750 MG tablet Take 1 tablet (750 mg) by mouth every 6 hours as needed for muscle spasms      Any other pertinent information (if none, N/A): N/A    Refilled: Y/N, if NO, why?

## 2023-05-19 ENCOUNTER — PATIENT OUTREACH (OUTPATIENT)
Dept: SURGERY | Facility: CLINIC | Age: 23
End: 2023-05-19
Payer: COMMERCIAL

## 2023-05-19 NOTE — TELEPHONE ENCOUNTER
Called patient to follow-up and see how she has been recovering post-operatively.   No answer. Left voicemail message requesting return call. Call back information provided.     Josey Rogel RN, BSN  Thoracic Surgery RN Care Coordinator

## 2023-05-31 NOTE — PROGRESS NOTES
THORACIC SURGERY FOLLOW UP VISIT    Dear Dr. Wood,  I saw Ms. Kellee Turk in follow-up today. The clinical summary follows:     PREOP DIAGNOSIS   Pectus excavatum  PROCEDURE   1.  Modified Ravitch repair with sternal plating and retrosternal bar placement.  2.  Intercostal nerve cryoablation from spaces 2, 3, 4, and 5 bilaterally.  DATE OF PROCEDURE  04/24/2023    COMPLICATIONS  None    INTERVAL STUDIES  CXR: Final report pending at time of clinic visit. To my review, it appears as expected with an intact Sravan bar.    SUBJECTIVE  Kellee is doing really good. She doesn't have muscle spasms as frequently as she did the first two weeks. She has some random numb spots on her chest and occasionally has some skim hypersensitivity. The sternal area is completely numb still. Her swallowing and breathing are improved compared to before surgery.    OBJECTIVE  /58 (BP Location: Left arm, Patient Position: Sitting, Cuff Size: Adult Small)   Pulse 85   Wt 39.6 kg (87 lb 3.2 oz)   LMP 04/17/2023   SpO2 97%   BMI 13.97 kg/m       Her incision is healing nicely. There is still some surgical glue on part of the incision.     From a personal perspective, she recently graduated from [employer]. She is here with her mother, father and brother.    IMPRESSION   23 year-old female status post Modified Ravitch repair with sternal plating and retrosternal bar replacement for repair of a pectus excavatum and intercostal nerve cryoablation from spaces 2, 3, 4 and 5 bilaterally for post operative pain control. She is here for post operative follow up.    PLAN  I spent 25 min on the date of the encounter in chart review, patient visit, review of tests, documentation and/or discussion with other providers about the issues documented above. I reviewed the plan as follows:  She will see Dr. Wood in 6 months to discuss potential removal of the Sravan bar.  All questions were answered and the patient and present family were in agreement with  the plan.  I appreciate the opportunity to participate in the care of your patient and will keep you updated.  Sincerely,

## 2023-06-01 ENCOUNTER — OFFICE VISIT (OUTPATIENT)
Dept: PULMONOLOGY | Facility: CLINIC | Age: 23
End: 2023-06-01
Payer: COMMERCIAL

## 2023-06-01 ENCOUNTER — HOSPITAL ENCOUNTER (OUTPATIENT)
Dept: GENERAL RADIOLOGY | Facility: HOSPITAL | Age: 23
Discharge: HOME | End: 2023-06-01
Attending: PHYSICIAN ASSISTANT | Admitting: PHYSICIAN ASSISTANT
Payer: COMMERCIAL

## 2023-06-01 VITALS
DIASTOLIC BLOOD PRESSURE: 58 MMHG | BODY MASS INDEX: 13.97 KG/M2 | SYSTOLIC BLOOD PRESSURE: 102 MMHG | WEIGHT: 87.2 LBS | OXYGEN SATURATION: 97 % | HEART RATE: 85 BPM

## 2023-06-01 DIAGNOSIS — Q67.6 PECTUS EXCAVATUM: Primary | ICD-10-CM

## 2023-06-01 DIAGNOSIS — Q67.6 PECTUS EXCAVATUM: ICD-10-CM

## 2023-06-01 PROCEDURE — 99024 POSTOP FOLLOW-UP VISIT: CPT | Performed by: CLINICAL NURSE SPECIALIST

## 2023-06-01 PROCEDURE — 71046 X-RAY EXAM CHEST 2 VIEWS: CPT

## 2023-07-09 ENCOUNTER — HEALTH MAINTENANCE LETTER (OUTPATIENT)
Age: 23
End: 2023-07-09

## 2023-08-26 ENCOUNTER — OFFICE VISIT (OUTPATIENT)
Dept: URGENT CARE | Facility: URGENT CARE | Age: 23
End: 2023-08-26
Payer: COMMERCIAL

## 2023-08-26 VITALS
RESPIRATION RATE: 20 BRPM | DIASTOLIC BLOOD PRESSURE: 81 MMHG | BODY MASS INDEX: 14.24 KG/M2 | WEIGHT: 88.9 LBS | SYSTOLIC BLOOD PRESSURE: 119 MMHG | HEART RATE: 78 BPM | TEMPERATURE: 97.5 F | OXYGEN SATURATION: 97 %

## 2023-08-26 DIAGNOSIS — H10.31 ACUTE CONJUNCTIVITIS OF RIGHT EYE, UNSPECIFIED ACUTE CONJUNCTIVITIS TYPE: Primary | ICD-10-CM

## 2023-08-26 PROCEDURE — 99213 OFFICE O/P EST LOW 20 MIN: CPT | Performed by: FAMILY MEDICINE

## 2023-08-26 RX ORDER — TOBRAMYCIN AND DEXAMETHASONE 3; 1 MG/ML; MG/ML
1-2 SUSPENSION/ DROPS OPHTHALMIC 4 TIMES DAILY
Qty: 2.8 ML | Refills: 0 | Status: SHIPPED | OUTPATIENT
Start: 2023-08-26 | End: 2023-09-02

## 2023-08-26 RX ORDER — DOXYCYCLINE 100 MG/1
100 CAPSULE ORAL 2 TIMES DAILY
Qty: 14 CAPSULE | Refills: 0 | Status: SHIPPED | OUTPATIENT
Start: 2023-08-26 | End: 2023-09-02

## 2023-08-26 NOTE — PATIENT INSTRUCTIONS
Use the drops both eyes    If symptoms not significantly better, fill prescription for oral antibiotic    Follow up as needed based on symptoms     Be seen promptly if symptoms acutely worsen

## 2023-08-26 NOTE — PROGRESS NOTES
Kellee Turk is a 23 year old female who comes in today for right eye problems.      Symptoms for a few days but worse today    Redness started today     Hard to open eye    No contacts    Wears glasses    No change in vision    No trauma to eye    Dad and sister both with eye issues currently       ROS    Physical Exam  Constitutional:       Appearance: Normal appearance.   HENT:      Head: Normocephalic and atraumatic.      Right Ear: Tympanic membrane, ear canal and external ear normal.      Left Ear: Tympanic membrane, ear canal and external ear normal.      Nose: Nose normal.      Mouth/Throat:      Mouth: Mucous membranes are moist.      Pharynx: Oropharynx is clear.   Eyes:      Comments: Right eye mildly red.  No colored discharge.  Clear tears present.  No crusting.  Lids look okay.  Perrl, eom intact.     Cardiovascular:      Rate and Rhythm: Normal rate and regular rhythm.      Heart sounds: Normal heart sounds.   Pulmonary:      Effort: Pulmonary effort is normal. No respiratory distress.      Breath sounds: Normal breath sounds. No wheezing.   Neurological:      Mental Status: She is alert.     No sinus/ submandib tenderness      ASSESSMENT / PLAN:  (H10.31) Acute conjunctivitis of right eye, unspecified acute conjunctivitis type  (primary encounter diagnosis)  Comment: start with just eye drops, combination steroid/antibiotic.  If symptoms not significantly improving then could add the oral antibiotic.  Her sister apparently needed both topical and oral meds.  Plan: tobramycin-dexAMETHasone (TOBRADEX) 0.3-0.1 %         ophthalmic suspension, doxycycline hyclate         (VIBRAMYCIN) 100 MG capsule             Be seen promptly if symptoms acutely worsen       I reviewed the patient's medications, allergies, medical history, family history, and social history.    Wilber Boss MD

## 2023-08-31 ENCOUNTER — MYC REFILL (OUTPATIENT)
Dept: URGENT CARE | Facility: URGENT CARE | Age: 23
End: 2023-08-31
Payer: COMMERCIAL

## 2023-08-31 DIAGNOSIS — H10.31 ACUTE CONJUNCTIVITIS OF RIGHT EYE, UNSPECIFIED ACUTE CONJUNCTIVITIS TYPE: ICD-10-CM

## 2023-08-31 RX ORDER — TOBRAMYCIN AND DEXAMETHASONE 3; 1 MG/ML; MG/ML
1-2 SUSPENSION/ DROPS OPHTHALMIC 4 TIMES DAILY
Qty: 2.8 ML | Refills: 0 | OUTPATIENT
Start: 2023-08-31

## 2023-10-17 NOTE — PROGRESS NOTES
THORACIC SURGERY FOLLOW UP VISIT    I saw Ms. Kellee Turk in follow-up today. The clinical summary follows:     PREOP DIAGNOSIS   Pectus excavatum  PROCEDURE   04/24/2023  Modified Ravitch repair with retrosternal bar placement and sternal plating. Intercostal nerve cryoablation.     COMPLICATIONS  None    INTERVAL STUDIES  None     No past medical history on file.  Past Surgical History:   Procedure Laterality Date    REPAIR PECTUS EXCAVATUM N/A 4/24/2023    Procedure: REPAIR, PECTUS EXCAVATUM, sternal plating, cryoablation intercostal nerve.;  Surgeon: Bay Nevarez MD;  Location: [facility] OR        Allergies   Allergen Reactions    Amoxicillin Shortness Of Breath and Rash    Watermelon Flavor      Current Outpatient Medications   Medication    acetaminophen (TYLENOL) 325 MG tablet    gabapentin (NEURONTIN) 300 MG capsule    methocarbamol (ROBAXIN) 750 MG tablet    Multiple Vitamin (MULTIVITAMIN OR)     No current facility-administered medications for this visit.     Social History     Tobacco Use    Smoking status: Never     Passive exposure: Never    Smokeless tobacco: Never   Vaping Use    Vaping Use: Never used   Substance Use Topics    Alcohol use: No     Alcohol/week: 0.0 standard drinks of alcohol    Drug use: No       SUBJECTIVE  Kellee is doing very well. She comes to clinic for a follow up. She has minor pains and clicks on her chest but nothing too bad. She is able to sleep on her side now.     From a personal perspective, she comes to clinic with her family. She just passed all her test to be a !    IMPRESSION   23 year old female patient with pectus excavatum 6 months s/p Ravitch.     PLAN  I spent 30 min on the date of the encounter in chart review, patient visit, review of tests, documentation and/or discussion with other providers about the issues documented above. I reviewed the plan as follows:  She is recovering well from surgery. I told her I would like to wait at least a  year before removing her bar to avoid recurrence.   Follow up in 6 months.   All questions were answered and the patient and present family were in agreement with the plan.  I appreciate the opportunity to participate in the care of your patient and will keep you updated.  Sincerely,  Bay Obrien MD

## 2023-10-18 ENCOUNTER — ONCOLOGY VISIT (OUTPATIENT)
Dept: SURGERY | Facility: CLINIC | Age: 23
End: 2023-10-18
Attending: THORACIC SURGERY (CARDIOTHORACIC VASCULAR SURGERY)
Payer: COMMERCIAL

## 2023-10-18 VITALS
TEMPERATURE: 97.8 F | SYSTOLIC BLOOD PRESSURE: 112 MMHG | RESPIRATION RATE: 18 BRPM | DIASTOLIC BLOOD PRESSURE: 70 MMHG | BODY MASS INDEX: 14.26 KG/M2 | WEIGHT: 89 LBS | HEART RATE: 57 BPM | OXYGEN SATURATION: 97 %

## 2023-10-18 DIAGNOSIS — Q67.6 PECTUS EXCAVATUM: Primary | ICD-10-CM

## 2023-10-18 PROCEDURE — 99213 OFFICE O/P EST LOW 20 MIN: CPT | Performed by: THORACIC SURGERY (CARDIOTHORACIC VASCULAR SURGERY)

## 2023-10-18 ASSESSMENT — PAIN SCALES - GENERAL: PAINLEVEL: NO PAIN (0)

## 2023-10-18 NOTE — LETTER
10/18/2023         RE: Kellee Turk  [address] [facility] [id number]        Dear Colleague,    Thank you for referring your patient, Kellee Turk, to the [facility]. Please see a copy of my visit note below.    THORACIC SURGERY FOLLOW UP VISIT    I saw Ms. Kellee Turk in follow-up today. The clinical summary follows:     PREOP DIAGNOSIS   Pectus excavatum  PROCEDURE   04/24/2023  Modified Ravitch repair with retrosternal bar placement and sternal plating. Intercostal nerve cryoablation.     COMPLICATIONS  None    INTERVAL STUDIES  None     No past medical history on file.  Past Surgical History:   Procedure Laterality Date    REPAIR PECTUS EXCAVATUM N/A 4/24/2023    Procedure: REPAIR, PECTUS EXCAVATUM, sternal plating, cryoablation intercostal nerve.;  Surgeon: Bay Nevarez MD;  Location: [facility] OR        Allergies   Allergen Reactions    Amoxicillin Shortness Of Breath and Rash    Watermelon Flavor      Current Outpatient Medications   Medication    acetaminophen (TYLENOL) 325 MG tablet    gabapentin (NEURONTIN) 300 MG capsule    methocarbamol (ROBAXIN) 750 MG tablet    Multiple Vitamin (MULTIVITAMIN OR)     No current facility-administered medications for this visit.     Social History     Tobacco Use    Smoking status: Never     Passive exposure: Never    Smokeless tobacco: Never   Vaping Use    Vaping Use: Never used   Substance Use Topics    Alcohol use: No     Alcohol/week: 0.0 standard drinks of alcohol    Drug use: No       SUBJECTIVE  Kellee is doing very well. She comes to clinic for a follow up. She has minor pains and clicks on her chest but nothing too bad. She is able to sleep on her side now.     From a personal perspective, she comes to clinic with her family. She just passed all her test to be a !    IMPRESSION   23 year old female patient with pectus excavatum 6 months s/p Ravitch.     PLAN  I spent 30 min on the date of  the encounter in chart review, patient visit, review of tests, documentation and/or discussion with other providers about the issues documented above. I reviewed the plan as follows:  She is recovering well from surgery. I told her I would like to wait at least a year before removing her bar to avoid recurrence.   Follow up in 6 months.   All questions were answered and the patient and present family were in agreement with the plan.  I appreciate the opportunity to participate in the care of your patient and will keep you updated.  Sincerely,  Bay Obrien MD

## 2023-10-18 NOTE — NURSING NOTE
"Oncology Rooming Note    October 18, 2023 8:58 AM   Kellee Turk is a 23 year old female who presents for:    Chief Complaint   Patient presents with    Oncology Clinic Visit     Pectus Excavatum     Initial Vitals: /70 (BP Location: Right arm, Patient Position: Sitting, Cuff Size: Adult Small)   Pulse 57   Temp 97.8  F (36.6  C) (Oral)   Resp 18   Wt 40.4 kg (89 lb)   SpO2 97%   BMI 14.26 kg/m   Estimated body mass index is 14.26 kg/m  as calculated from the following:    Height as of 5/4/23: 1.683 m (5' 6.25\").    Weight as of this encounter: 40.4 kg (89 lb). Body surface area is 1.37 meters squared.  No Pain (0) Comment: Data Unavailable   No LMP recorded.  Allergies reviewed: Yes  Medications reviewed: Yes    Medications: Medication refills not needed today.  Pharmacy name entered into [employer]:    [id number] [place], [place] [facility] [address] [id number] [address] AT [facility]  [address]    Clinical concerns: Pt states she is feeling \"very stressed\" related to her visit today.       Tequila Ko LPN  10/18/2023                "

## 2023-12-08 ENCOUNTER — TELEPHONE (OUTPATIENT)
Dept: SURGERY | Facility: CLINIC | Age: 23
End: 2023-12-08
Payer: COMMERCIAL

## 2023-12-08 NOTE — TELEPHONE ENCOUNTER
On 12/8/23 we received the maximum number of attempts regarding calling Kellee to schedule a routine CT scan and a followup with Dr. Wood. Previously she was called on 12/4 & 11/29. Initially we were aiming to get the CT & followup scheduled by the end of December 2023.

## 2023-12-22 ENCOUNTER — ANCILLARY PROCEDURE (OUTPATIENT)
Dept: CT IMAGING | Facility: CLINIC | Age: 23
End: 2023-12-22
Attending: CLINICAL NURSE SPECIALIST
Payer: COMMERCIAL

## 2023-12-22 DIAGNOSIS — Q67.6 PECTUS EXCAVATUM: ICD-10-CM

## 2023-12-22 PROCEDURE — 71250 CT THORAX DX C-: CPT | Mod: GC | Performed by: RADIOLOGY

## 2024-05-01 ENCOUNTER — ONCOLOGY VISIT (OUTPATIENT)
Dept: SURGERY | Facility: CLINIC | Age: 24
End: 2024-05-01
Attending: THORACIC SURGERY (CARDIOTHORACIC VASCULAR SURGERY)
Payer: COMMERCIAL

## 2024-05-01 VITALS
BODY MASS INDEX: 13.98 KG/M2 | DIASTOLIC BLOOD PRESSURE: 81 MMHG | HEART RATE: 90 BPM | WEIGHT: 87.3 LBS | TEMPERATURE: 98.1 F | SYSTOLIC BLOOD PRESSURE: 115 MMHG | RESPIRATION RATE: 16 BRPM | OXYGEN SATURATION: 97 %

## 2024-05-01 DIAGNOSIS — Q67.6 PECTUS EXCAVATUM: Primary | ICD-10-CM

## 2024-05-01 PROCEDURE — 99213 OFFICE O/P EST LOW 20 MIN: CPT | Performed by: THORACIC SURGERY (CARDIOTHORACIC VASCULAR SURGERY)

## 2024-05-01 RX ORDER — IBUPROFEN 600 MG/1
TABLET, FILM COATED ORAL
COMMUNITY
Start: 2023-11-07

## 2024-05-01 ASSESSMENT — PAIN SCALES - GENERAL: PAINLEVEL: NO PAIN (0)

## 2024-05-01 NOTE — PROGRESS NOTES
THORACIC SURGERY FOLLOW UP VISIT    I saw Ms. Kellee Turk in follow-up today. The clinical summary follows:     DIAGNOSIS   Pectus excavatum s/p modified Ravitch repair.     PROCEDURE   04/24/2023  Modified Ravitch repair with retrosternal bar placement and sternal plating. Intercostal nerve cryoablation.      COMPLICATIONS  None     INTERVAL STUDIES  None     Previsit tests  Preop CT scan       Post op CT scan       No past medical history on file.    Past Surgical History:   Procedure Laterality Date    REPAIR PECTUS EXCAVATUM N/A 4/24/2023    Procedure: REPAIR, PECTUS EXCAVATUM, sternal plating, cryoablation intercostal nerve.;  Surgeon: Bay Nevarez MD;  Location: [facility] OR        Allergies   Allergen Reactions    Amoxicillin Shortness Of Breath and Rash    Watermelon Flavor      Current Outpatient Medications   Medication Sig Dispense Refill    acetaminophen (TYLENOL) 325 MG tablet Take 2 tablets (650 mg) by mouth every 4 hours as needed for other (For optimal non-opioid multimodal pain management to improve pain control.)      gabapentin (NEURONTIN) 300 MG capsule Take 1 capsule (300 mg) by mouth 2 times daily for 60 days (Patient not taking: Reported on 6/1/2023) 120 capsule 0    methocarbamol (ROBAXIN) 750 MG tablet Take 1 tablet (750 mg) by mouth every 6 hours as needed for muscle spasms (Patient not taking: Reported on 10/18/2023) 40 tablet 0    Multiple Vitamin (MULTIVITAMIN OR) Take by mouth daily       No current facility-administered medications for this visit.     Social History     Tobacco Use    Smoking status: Never     Passive exposure: Never    Smokeless tobacco: Never   Vaping Use    Vaping status: Never Used   Substance Use Topics    Alcohol use: No     Alcohol/week: 0.0 standard drinks of alcohol    Drug use: No       Exam   /81 (BP Location: Right arm, Patient Position: Sitting, Cuff Size: Adult Regular)   Pulse 90   Temp 98.1  F (36.7  C) (Oral)   Resp 16   Wt 39.6 kg  (87 lb 4.8 oz)   SpO2 97%   BMI 13.98 kg/m    Alert and oriented NAD.   Bilateral breath sounds.     SUBJECTIVE  Kellee is doing great. She has minimal chest pressure occasionally. She is back to her normal life. She can hear a pop every now and then.     From a personal perspective, she comes to clinic with her family. She went to [employer] school, and now she works for [employer].     IMPRESSION  24 year old female patient with pectus excavatum 12 months s/p Ravitch repair.     PLAN  I spent 30 min on the date of the encounter in chart review, patient visit, review of tests, documentation and/or discussion with other providers about the issues documented above. I reviewed the plan as follows:  We can plan on removing her retrosternal bar after the summer. She will contact our office to let us know.   All questions were answered and the patient and present family were in agreement with the plan.  I appreciate the opportunity to participate in the care of your patient and will keep you updated.  Sincerely,  Bay Obrien MD

## 2024-05-01 NOTE — NURSING NOTE
"Oncology Rooming Note    May 1, 2024 8:51 AM   Kellee Turk is a 24 year old female who presents for:    Chief Complaint   Patient presents with    Oncology Clinic Visit      Pectus excavatum     Initial Vitals: /81 (BP Location: Right arm, Patient Position: Sitting, Cuff Size: Adult Regular)   Pulse 90   Temp 98.1  F (36.7  C) (Oral)   Resp 16   Wt 39.6 kg (87 lb 4.8 oz)   SpO2 97%   BMI 13.98 kg/m   Estimated body mass index is 13.98 kg/m  as calculated from the following:    Height as of 5/4/23: 1.683 m (5' 6.25\").    Weight as of this encounter: 39.6 kg (87 lb 4.8 oz). Body surface area is 1.36 meters squared.  No Pain (0) Comment: Data Unavailable   No LMP recorded.  Allergies reviewed: Yes  Medications reviewed: Yes    Medications: Medication refills not needed today.  Pharmacy name entered into [facility]:    [id number] [place] [facility] [address] [id number] [place], [place] - [address] AT [facility] [address]    Frailty Screening:   Is the patient here for a new oncology consult visit in cancer care? 2. No      Clinical concerns: none      Haresh Thomas"

## 2024-05-01 NOTE — LETTER
5/1/2024         RE: Kellee Turk  [address] [facility] [address]        Dear Colleague,    Thank you for referring your patient, Kellee Turk, to the [facility]. Please see a copy of my visit note below.    THORACIC SURGERY FOLLOW UP VISIT    I saw Ms. Kellee Turk in follow-up today. The clinical summary follows:     DIAGNOSIS   Pectus excavatum s/p modified Ravitch repair.     PROCEDURE   04/24/2023  Modified Ravitch repair with retrosternal bar placement and sternal plating. Intercostal nerve cryoablation.      COMPLICATIONS  None     INTERVAL STUDIES  None     Previsit tests  Preop CT scan       Post op CT scan       No past medical history on file.    Past Surgical History:   Procedure Laterality Date    REPAIR PECTUS EXCAVATUM N/A 4/24/2023    Procedure: REPAIR, PECTUS EXCAVATUM, sternal plating, cryoablation intercostal nerve.;  Surgeon: Bay Nevarez MD;  Location: [facility] OR        Allergies   Allergen Reactions    Amoxicillin Shortness Of Breath and Rash    Watermelon Flavor      Current Outpatient Medications   Medication Sig Dispense Refill    acetaminophen (TYLENOL) 325 MG tablet Take 2 tablets (650 mg) by mouth every 4 hours as needed for other (For optimal non-opioid multimodal pain management to improve pain control.)      gabapentin (NEURONTIN) 300 MG capsule Take 1 capsule (300 mg) by mouth 2 times daily for 60 days (Patient not taking: Reported on 6/1/2023) 120 capsule 0    methocarbamol (ROBAXIN) 750 MG tablet Take 1 tablet (750 mg) by mouth every 6 hours as needed for muscle spasms (Patient not taking: Reported on 10/18/2023) 40 tablet 0    Multiple Vitamin (MULTIVITAMIN OR) Take by mouth daily       No current facility-administered medications for this visit.     Social History     Tobacco Use    Smoking status: Never     Passive exposure: Never    Smokeless tobacco: Never   Vaping Use    Vaping status: Never Used   Substance  Use Topics    Alcohol use: No     Alcohol/week: 0.0 standard drinks of alcohol    Drug use: No       Exam   /81 (BP Location: Right arm, Patient Position: Sitting, Cuff Size: Adult Regular)   Pulse 90   Temp 98.1  F (36.7  C) (Oral)   Resp 16   Wt 39.6 kg (87 lb 4.8 oz)   SpO2 97%   BMI 13.98 kg/m    Alert and oriented NAD.   Bilateral breath sounds.     SUBJECTIVE  Kellee is doing great. She has minimal chest pressure occasionally. She is back to her normal life. She can hear a pop every now and then.     From a personal perspective, she comes to clinic with her family. She went to [employer], and now she works for [employer].     IMPRESSION  24 year old female patient with pectus excavatum 12 months s/p Ravitch repair.     PLAN  I spent 30 min on the date of the encounter in chart review, patient visit, review of tests, documentation and/or discussion with other providers about the issues documented above. I reviewed the plan as follows:  We can plan on removing her retrosternal bar after the summer. She will contact our office to let us know.   All questions were answered and the patient and present family were in agreement with the plan.  I appreciate the opportunity to participate in the care of your patient and will keep you updated.  Sincerely,  Bay Obrien MD

## 2024-06-22 ENCOUNTER — OFFICE VISIT (OUTPATIENT)
Dept: URGENT CARE | Facility: URGENT CARE | Age: 24
End: 2024-06-22
Payer: COMMERCIAL

## 2024-06-22 VITALS
OXYGEN SATURATION: 98 % | BODY MASS INDEX: 13.94 KG/M2 | DIASTOLIC BLOOD PRESSURE: 82 MMHG | TEMPERATURE: 97.7 F | HEART RATE: 109 BPM | WEIGHT: 87 LBS | SYSTOLIC BLOOD PRESSURE: 114 MMHG

## 2024-06-22 DIAGNOSIS — J06.9 VIRAL URI WITH COUGH: Primary | ICD-10-CM

## 2024-06-22 PROCEDURE — 87635 SARS-COV-2 COVID-19 AMP PRB: CPT | Performed by: INTERNAL MEDICINE

## 2024-06-22 PROCEDURE — 99213 OFFICE O/P EST LOW 20 MIN: CPT | Performed by: INTERNAL MEDICINE

## 2024-06-22 RX ORDER — FLUTICASONE PROPIONATE 50 MCG
1 SPRAY, SUSPENSION (ML) NASAL 2 TIMES DAILY
Qty: 16 G | Refills: 0 | Status: SHIPPED | OUTPATIENT
Start: 2024-06-22 | End: 2024-07-02

## 2024-06-22 RX ORDER — BENZONATATE 200 MG/1
200 CAPSULE ORAL 3 TIMES DAILY PRN
Qty: 30 CAPSULE | Refills: 0 | Status: SHIPPED | OUTPATIENT
Start: 2024-06-22

## 2024-06-22 NOTE — PROGRESS NOTES
ASSESSMENT AND PLAN:      ICD-10-CM    1. Viral URI with cough  J06.9 fluticasone (FLONASE) 50 MCG/ACT nasal spray     benzonatate (TESSALON) 200 MG capsule     Symptomatic COVID-19 Virus (Coronavirus) by PCR          PLAN:  URI Adult:  Fluids, Rest, and Saline nasal spray    Camille Carrero MD  [facility]    Subjective     Kellee Turk is a 24 year old who presents for Patient presents with:  Cough: 1 week   Nasal Congestion: 1 week  URI    an established patient of [facility].    URI   Onset of symptoms was 1 week(s) ago.    Current and Associated symptoms: runny nose and sore throat  Nasal congestion  Eyes were itching  Turned into a dry cough  Denies fever, chills, sweats, headache, body aches, vomiting, and diarrhea  Treatment measures tried include Antihistamine - zyrtec  Predisposing factors include ill contact: unknown and seasonal allergies      Review of Systems        Objective    /82 (BP Location: Left arm, Patient Position: Sitting, Cuff Size: Adult Small)   Pulse 109   Temp 97.7  F (36.5  C) (Tympanic)   Wt 39.5 kg (87 lb)   SpO2 98%   BMI 13.94 kg/m    Physical Exam  Vitals reviewed.   Constitutional:       General: She is not in acute distress.     Appearance: Normal appearance. She is ill-appearing. She is not toxic-appearing.   HENT:      Right Ear: Tympanic membrane normal.      Left Ear: Tympanic membrane normal.      Nose: Nose normal. No congestion.      Mouth/Throat:      Mouth: Mucous membranes are moist.      Pharynx: Oropharynx is clear.   Cardiovascular:      Rate and Rhythm: Normal rate and regular rhythm.      Pulses: Normal pulses.      Heart sounds: Normal heart sounds.   Pulmonary:      Effort: Pulmonary effort is normal.      Breath sounds: Normal breath sounds.   Neurological:      Mental Status: She is alert.

## 2024-06-23 LAB — SARS-COV-2 RNA RESP QL NAA+PROBE: NEGATIVE

## 2024-09-01 ENCOUNTER — HEALTH MAINTENANCE LETTER (OUTPATIENT)
Age: 24
End: 2024-09-01

## 2024-10-01 NOTE — PROGRESS NOTES
"THORACIC SURGERY FOLLOW UP VISIT    I saw Ms. Kellee Turk in follow-up today. The clinical summary follows:     DIAGNOSIS   Pectus excavatum  DIAGNOSIS   Pectus excavatum s/p modified Ravitch repair.   PROCEDURE   04/24/2023  Modified Ravitch repair with retrosternal bar placement and sternal plating. Intercostal nerve cryoablation.   COMPLICATIONS  None  INTERVAL STUDIES  None   Previsit tests  Preop CT scan        Post op CT scan     No past medical history on file.  Past Surgical History:   Procedure Laterality Date    REPAIR PECTUS EXCAVATUM N/A 4/24/2023    Procedure: REPAIR, PECTUS EXCAVATUM, sternal plating, cryoablation intercostal nerve.;  Surgeon: Bay Nevarez MD;  Location: [facility]        Allergies   Allergen Reactions    Amoxicillin Shortness Of Breath and Rash    Watermelon Flavor      Current Outpatient Medications   Medication Sig Dispense Refill    benzonatate (TESSALON) 200 MG capsule Take 1 capsule (200 mg) by mouth 3 times daily as needed for cough 30 capsule 0    ibuprofen (ADVIL/MOTRIN) 600 MG tablet take 1 tablet by mouth every 6 to 8 hours as needed      Multiple Vitamin (MULTIVITAMIN OR) Take by mouth daily       No current facility-administered medications for this visit.     Social History     Tobacco Use    Smoking status: Never     Passive exposure: Never    Smokeless tobacco: Never   Vaping Use    Vaping status: Never Used   Substance Use Topics    Alcohol use: No     Alcohol/week: 0.0 standard drinks of alcohol    Drug use: No     Exam   /84 (BP Location: Right arm, Patient Position: Sitting, Cuff Size: Adult Small)   Pulse 89   Temp 97.8  F (36.6  C)   Resp 18   Ht 1.67 m (5' 5.75\")   Wt 40.4 kg (89 lb)   SpO2 97%   BMI 14.48 kg/m    Alert and oriented NAD.   Bilateral breath sounds.     TOMA Goodson comes to clinic today for a preop appointment to get her bar removed as planned. She is otherwise doing well.     From a personal perspective, she comes to " clinic with her family.     IMPRESSION   24 year old female patient with pectus excavatum 1.5 years s/p modified Ravitch repair.     PLAN  I spent 30 min on the date of the encounter in chart review, patient visit, review of tests, documentation and/or discussion with other providers about the issues documented above. I reviewed the plan as follows:  Procedure planned: Planned retrosternal bar removal   Necessary tests and appointments: H&P by PCP.   Regional anesthesia: None    I had an extensive discussion with the patient and her family regarding the rationale for surgery, the alternatives risks and benefits of the procedure. I explained the expected hospital stay, postoperative course, recovery time, diet and activity restrictions. Informed consent was obtained and they agreed to proceed.  All questions were answered and the patient and present family were in agreement with the plan.  I appreciate the opportunity to participate in the care of your patient and will keep you updated.  Sincerely,  Bay Obrien MD

## 2024-10-02 ENCOUNTER — PREP FOR PROCEDURE (OUTPATIENT)
Dept: SURGERY | Facility: CLINIC | Age: 24
End: 2024-10-02

## 2024-10-02 ENCOUNTER — ONCOLOGY VISIT (OUTPATIENT)
Dept: SURGERY | Facility: CLINIC | Age: 24
End: 2024-10-02
Attending: THORACIC SURGERY (CARDIOTHORACIC VASCULAR SURGERY)
Payer: COMMERCIAL

## 2024-10-02 VITALS
DIASTOLIC BLOOD PRESSURE: 84 MMHG | OXYGEN SATURATION: 97 % | HEART RATE: 89 BPM | BODY MASS INDEX: 14.3 KG/M2 | WEIGHT: 89 LBS | RESPIRATION RATE: 18 BRPM | SYSTOLIC BLOOD PRESSURE: 121 MMHG | TEMPERATURE: 97.8 F | HEIGHT: 66 IN

## 2024-10-02 DIAGNOSIS — Q67.6 PECTUS EXCAVATUM: Primary | ICD-10-CM

## 2024-10-02 PROCEDURE — 99213 OFFICE O/P EST LOW 20 MIN: CPT | Performed by: THORACIC SURGERY (CARDIOTHORACIC VASCULAR SURGERY)

## 2024-10-02 PROCEDURE — 99214 OFFICE O/P EST MOD 30 MIN: CPT | Performed by: THORACIC SURGERY (CARDIOTHORACIC VASCULAR SURGERY)

## 2024-10-02 RX ORDER — ACETAMINOPHEN 325 MG/1
975 TABLET ORAL ONCE
OUTPATIENT
Start: 2024-10-02 | End: 2024-10-02

## 2024-10-02 ASSESSMENT — PAIN SCALES - GENERAL: PAINLEVEL: NO PAIN (0)

## 2024-10-02 NOTE — LETTER
"10/2/2024      Kellee Turk  [address] [address] [place] [address]      Dear Colleague,    Thank you for referring your patient, Kellee Turk, to the [facility]. Please see a copy of my visit note below.    THORACIC SURGERY FOLLOW UP VISIT    I saw Ms. Kellee Turk in follow-up today. The clinical summary follows:     DIAGNOSIS   Pectus excavatum  DIAGNOSIS   Pectus excavatum s/p modified Ravitch repair.   PROCEDURE   04/24/2023  Modified Ravitch repair with retrosternal bar placement and sternal plating. Intercostal nerve cryoablation.   COMPLICATIONS  None  INTERVAL STUDIES  None   Previsit tests  Preop CT scan        Post op CT scan     No past medical history on file.  Past Surgical History:   Procedure Laterality Date     REPAIR PECTUS EXCAVATUM N/A 4/24/2023    Procedure: REPAIR, PECTUS EXCAVATUM, sternal plating, cryoablation intercostal nerve.;  Surgeon: Bay Nevarez MD;  Location: [facility] OR        Allergies   Allergen Reactions     Amoxicillin Shortness Of Breath and Rash     Watermelon Flavor      Current Outpatient Medications   Medication Sig Dispense Refill     benzonatate (TESSALON) 200 MG capsule Take 1 capsule (200 mg) by mouth 3 times daily as needed for cough 30 capsule 0     ibuprofen (ADVIL/MOTRIN) 600 MG tablet take 1 tablet by mouth every 6 to 8 hours as needed       Multiple Vitamin (MULTIVITAMIN OR) Take by mouth daily       No current facility-administered medications for this visit.     Social History     Tobacco Use     Smoking status: Never     Passive exposure: Never     Smokeless tobacco: Never   Vaping Use     Vaping status: Never Used   Substance Use Topics     Alcohol use: No     Alcohol/week: 0.0 standard drinks of alcohol     Drug use: No     Exam   /84 (BP Location: Right arm, Patient Position: Sitting, Cuff Size: Adult Small)   Pulse 89   Temp 97.8  F (36.6  C)   Resp 18   Ht 1.67 m (5' 5.75\")   Wt 40.4 kg (89 " lb)   SpO2 97%   BMI 14.48 kg/m    Alert and oriented NAD.   Bilateral breath sounds.     TOMA Goodson comes to clinic today for a preop appointment to get her bar removed as planned. She is otherwise doing well.     From a personal perspective, she comes to clinic with her family.     IMPRESSION   24 year old female patient with pectus excavatum 1.5 years s/p modified Ravitch repair.     PLAN  I spent 30 min on the date of the encounter in chart review, patient visit, review of tests, documentation and/or discussion with other providers about the issues documented above. I reviewed the plan as follows:  Procedure planned: Planned retrosternal bar removal   Necessary tests and appointments: H&P by PCP.   Regional anesthesia: None    I had an extensive discussion with the patient and her family regarding the rationale for surgery, the alternatives risks and benefits of the procedure. I explained the expected hospital stay, postoperative course, recovery time, diet and activity restrictions. Informed consent was obtained and they agreed to proceed.  All questions were answered and the patient and present family were in agreement with the plan.  I appreciate the opportunity to participate in the care of your patient and will keep you updated.  Sincerely,  Bay Obrien MD      Again, thank you for allowing me to participate in the care of your patient.        Sincerely,        Fernando Wood MD

## 2024-10-02 NOTE — NURSING NOTE
"Oncology Rooming Note    October 2, 2024 5:39 PM   Kellee Turk is a 24 year old female who presents for:    Chief Complaint   Patient presents with    Oncology Clinic Visit     Pectus excavatum     Initial Vitals: /84 (BP Location: Right arm, Patient Position: Sitting, Cuff Size: Adult Small)   Pulse 89   Temp 97.8  F (36.6  C)   Resp 18   Ht 1.67 m (5' 5.75\")   Wt 40.4 kg (89 lb)   SpO2 97%   BMI 14.48 kg/m   Estimated body mass index is 14.48 kg/m  as calculated from the following:    Height as of this encounter: 1.67 m (5' 5.75\").    Weight as of this encounter: 40.4 kg (89 lb). Body surface area is 1.37 meters squared.  No Pain (0) Comment: Data Unavailable   No LMP recorded.  Allergies reviewed: Yes  Medications reviewed: Yes    Medications: Medication refills not needed today.  Pharmacy name entered into [facility]:    [id number] [place] [facility] [address] [id number] [address] [facility]  [address]    Frailty Screening:   Is the patient here for a new oncology consult visit in cancer care? 2. No      Clinical concerns: none      True Salamanca LPN            "

## 2024-10-03 DIAGNOSIS — Q67.6 PECTUS EXCAVATUM: Primary | ICD-10-CM

## 2024-10-15 ENCOUNTER — TELEPHONE (OUTPATIENT)
Dept: SURGERY | Facility: CLINIC | Age: 24
End: 2024-10-15
Payer: COMMERCIAL

## 2024-10-15 NOTE — TELEPHONE ENCOUNTER
Called pt to confirm 11/6 surgery date with Dr. Wood and got no answer. Left voicemail message on both pt and her mos # with direct call back number [phone].    Veronica Stewart on 10/15/2024 at 10:42 AM

## 2024-10-15 NOTE — TELEPHONE ENCOUNTER
FUTURE VISIT INFORMATION      SURGERY INFORMATION:  Date: 11/6/24  [facility]  Surgeon:  Bay Nevarez MD   Anesthesia Type:  mac with local  Procedure: REMOVAL, HARDWARE, AFTER PECTUS EXCAVATUM REPAIR   Consult: ov 10/2/24    RECORDS REQUESTED FROM:       Primary Care Provider: [facility]    Most recent Cardiac Stress Test: 7/13/22

## 2024-10-22 ENCOUNTER — PRE VISIT (OUTPATIENT)
Dept: SURGERY | Facility: CLINIC | Age: 24
End: 2024-10-22

## 2024-11-26 ENCOUNTER — PATIENT OUTREACH (OUTPATIENT)
Dept: SURGERY | Facility: CLINIC | Age: 24
End: 2024-11-26
Payer: COMMERCIAL

## 2024-11-26 NOTE — TELEPHONE ENCOUNTER
Patient scheduled for REMOVAL, HARDWARE, AFTER PECTUS EXCAVATUM REPAIR with Dr Wood on 12/09/2024.     Called to review pre-op Education. No answer. Left message on patient's personal voicemail requesting a return call. Call back information provided.     Josey Rogel RN, BSN  Thoracic Surgery RN Care Coordinator

## 2024-11-26 NOTE — TELEPHONE ENCOUNTER
Patient returned writer's call.   Introduced self and role as Nurse Coordinator with Thoracic Surgery at [facility]. Reviewed post-operative expectations such as but not limited to pain management (including neuropathic pain), activity progression and constipation prevention. Medications reviewed and reconciled. Patient instructed to stop ibuprofen, naproxen, NSAIDS, fish oil/flax seed oil, Vit E, mineral/herbal supplements one week before surgery.  NPO guidelines and showering instructions reviewed. Patient is aware she will not have an overnoc hospital stay and will need a  after hospital discharge and a responsible caregiver to stay with them for 24 hours afterwards. The patient was instructed to notify the provider if there are any signs of a cold/flu or fever prior to surgery. The patient was instructed to avoid smoking tobacco/marijuana, vaping, chewing tobacco, or drinking alcohol for 1 week prior to surgery.     The patient was notified of post-op restrictions including: no heavy lifting >/= 10 lbs for 1-2 months, no carrying heavy backpacks/purses for 3 months, no contact sports for 3 months; no drastic changes in air pressure (no flying or scuba diving) for 8 weeks. Patient informed she should continue to use their incentive spirometer and ambulation post-op until they feel they have returned to their regular level of activity. Patient is aware she needs to remain well hydrated and eat a protein-rich diet post op. 10 minutes spent reviewing surgical expectations. Questions encouraged and answered questions as able. Patient verbalized understanding and in agreement with plan. No additional questions at this time.     Patient stated that she does not have any paperwork that needs to be completed.     Patient given contact information and will reach out to care coordinator with additional questions or concerns.  The hospital arrival time and estimated time for procedure was reviewed with patient.   Patient  appreciated writer's call.     Josey Rogel RN, BSN  Thoracic Surgery RN Care Coordinator

## 2024-12-01 LAB
ABO/RH(D): NORMAL
ANTIBODY SCREEN: NEGATIVE
SPECIMEN EXPIRATION DATE: NORMAL

## 2024-12-02 ENCOUNTER — OFFICE VISIT (OUTPATIENT)
Dept: SURGERY | Facility: CLINIC | Age: 24
End: 2024-12-02
Payer: COMMERCIAL

## 2024-12-02 ENCOUNTER — LAB (OUTPATIENT)
Dept: LAB | Facility: CLINIC | Age: 24
End: 2024-12-02
Payer: COMMERCIAL

## 2024-12-02 ENCOUNTER — ANESTHESIA EVENT (OUTPATIENT)
Dept: SURGERY | Facility: CLINIC | Age: 24
End: 2024-12-02
Payer: COMMERCIAL

## 2024-12-02 ENCOUNTER — PRE VISIT (OUTPATIENT)
Dept: SURGERY | Facility: CLINIC | Age: 24
End: 2024-12-02

## 2024-12-02 ENCOUNTER — APPOINTMENT (OUTPATIENT)
Dept: LAB | Facility: CLINIC | Age: 24
End: 2024-12-02
Payer: COMMERCIAL

## 2024-12-02 VITALS
BODY MASS INDEX: 13.95 KG/M2 | HEART RATE: 86 BPM | RESPIRATION RATE: 16 BRPM | HEIGHT: 67 IN | DIASTOLIC BLOOD PRESSURE: 61 MMHG | TEMPERATURE: 97.7 F | OXYGEN SATURATION: 98 % | WEIGHT: 88.9 LBS | SYSTOLIC BLOOD PRESSURE: 100 MMHG

## 2024-12-02 DIAGNOSIS — Z01.818 PREOP EXAMINATION: Primary | ICD-10-CM

## 2024-12-02 DIAGNOSIS — Q67.6 PECTUS EXCAVATUM: ICD-10-CM

## 2024-12-02 DIAGNOSIS — Z01.818 PREOP EXAMINATION: ICD-10-CM

## 2024-12-02 LAB
ANION GAP SERPL CALCULATED.3IONS-SCNC: 6 MMOL/L (ref 7–15)
BUN SERPL-MCNC: 5.8 MG/DL (ref 6–20)
CALCIUM SERPL-MCNC: 9.1 MG/DL (ref 8.8–10.4)
CHLORIDE SERPL-SCNC: 107 MMOL/L (ref 98–107)
CREAT SERPL-MCNC: 0.72 MG/DL (ref 0.51–0.95)
EGFRCR SERPLBLD CKD-EPI 2021: >90 ML/MIN/1.73M2
ERYTHROCYTE [DISTWIDTH] IN BLOOD BY AUTOMATED COUNT: 11 % (ref 10–15)
GLUCOSE SERPL-MCNC: 101 MG/DL (ref 70–99)
HCO3 SERPL-SCNC: 27 MMOL/L (ref 22–29)
HCT VFR BLD AUTO: 37.1 % (ref 35–47)
HGB BLD-MCNC: 12.8 G/DL (ref 11.7–15.7)
MCH RBC QN AUTO: 31 PG (ref 26.5–33)
MCHC RBC AUTO-ENTMCNC: 34.5 G/DL (ref 31.5–36.5)
MCV RBC AUTO: 90 FL (ref 78–100)
PLATELET # BLD AUTO: 171 10E3/UL (ref 150–450)
POTASSIUM SERPL-SCNC: 4.4 MMOL/L (ref 3.4–5.3)
RBC # BLD AUTO: 4.13 10E6/UL (ref 3.8–5.2)
SODIUM SERPL-SCNC: 140 MMOL/L (ref 135–145)
WBC # BLD AUTO: 4.6 10E3/UL (ref 4–11)

## 2024-12-02 PROCEDURE — 36415 COLL VENOUS BLD VENIPUNCTURE: CPT | Performed by: PATHOLOGY

## 2024-12-02 PROCEDURE — 99203 OFFICE O/P NEW LOW 30 MIN: CPT | Performed by: PHYSICIAN ASSISTANT

## 2024-12-02 PROCEDURE — 80048 BASIC METABOLIC PNL TOTAL CA: CPT | Performed by: PATHOLOGY

## 2024-12-02 PROCEDURE — 85027 COMPLETE CBC AUTOMATED: CPT | Performed by: PATHOLOGY

## 2024-12-02 ASSESSMENT — ENCOUNTER SYMPTOMS: SEIZURES: 0

## 2024-12-02 ASSESSMENT — PAIN SCALES - GENERAL: PAINLEVEL_OUTOF10: NO PAIN (0)

## 2024-12-02 ASSESSMENT — LIFESTYLE VARIABLES: TOBACCO_USE: 0

## 2024-12-02 NOTE — PATIENT INSTRUCTIONS
Preparing for Your Surgery      Name:  Kellee Turk   MRN:  [id number]   :  2000   Today's Date:  2024       Arriving for surgery:  Surgery date:  2024  Arrival time:  8:40 AM    Please come to:     Please come to:       [facility]    [address] [place]  [address]     The [facility] Patient/Visitor Ramp is at [address]. Patients and visitors who self-park will receive the reduced hospital parking rate. If the Patient /Visitor Ramp is full, please follow the signs to the [employer] car park located at the main hospital entrance.       parking is available (24 hours/ 7 days a week)      Discounted parking pass options are available for patients and visitors. They can be purchased at the [employer] desk at the main hospital entrance.     -    Stop at the security desk and they will direct surgery patients to the [facility] [phone]        - If you need directions, a wheelchair or an escort please stop at the Information/security desk in the lobby.     What can I eat or drink?  -  You may eat and drink normally up to 8 hours prior to arrival time. (Until Midnight)  -  You may have clear liquids until 2 hours prior to arrival time. (Until 6:40 AM)    Examples of clear liquids:  Water  Clear broth  Juices (apple, white grape, white cranberry  and cider) without pulp  Noncarbonated, powder based beverages  (lemonade and William-Aid)  Sodas (Sprite, 7-Up, ginger ale and seltzer)  Coffee or tea (without milk or cream)  Gatorade    -  No Alcohol or cannabis products for at least 24 hours before surgery.     Which medicines can I take?    Hold Aspirin for 7 days before surgery.   **Hold Multivitamins for 7 days before surgery.  Hold Supplements for 7 days before surgery.  Hold Ibuprofen (Advil, Motrin) for 7 day(s) before surgery--unless  otherwise directed by surgeon.  Hold Naproxen (Aleve) for 4 days before surgery.    How do I prepare myself?  - Please take 2 showers (one the night prior to surgery and one the morning of surgery) using Scrubcare or Hibiclens soap.    Use this soap only from the neck to your toes. Avoid genital area      Leave the soap on your skin for one minute--then rinse thoroughly.      You may use your own shampoo and conditioner. No other hair products.   - Please remove all jewelry and body piercings.  - No lotions, deodorants or fragrance.  - No makeup or fingernail polish.   - Bring your ID and insurance card.    -If you use a CPAP machine, please bring the CPAP machine, tubing, and mask to hospital.    -If you have a Deep Brain Stimulator, Spinal Cord Stimulator, or any Neuro Stimulator device---you must bring the remote control to the hospital.      ALL PATIENTS GOING HOME THE SAME DAY OF SURGERY ARE REQUIRED TO HAVE A RESPONSIBLE ADULT TO DRIVE AND BE IN ATTENDANCE WITH THEM FOR 24 HOURS FOLLOWING SURGERY.    Covid testing policy as of 12/06/2022  Your surgeon will notify and schedule you for a COVID test if one is needed before surgery--please direct any questions or COVID symptoms to your surgeon      Questions or Concerns:    - For any questions regarding the day of surgery or your hospital stay, please contact the Pre Admission Nursing Office at [phone].       - If you have health changes between today and your surgery, please call your surgeon.       - For questions after surgery, please call your surgeons office.           Current Visitor Guidelines    You may have 2 visitors in the pre op area.    Visiting hours: 8 a.m. to 8:30 p.m.    Patients confirmed or suspected to have symptoms of COVID 19 or flu:     No visitors allowed for adult patients.   Children (under age 18) can have 1 named visitor.     People who are sick or showing symptoms of COVID 19 or flu:    Are not allowed to visit patients--we can  only make exceptions in special situations.       Please follow these guidelines for your visit:          Please maintain social distance          Masking is optional--however at times you may be asked to wear a mask for the safety of yourself and others     Clean your hands with alcohol hand . Do this when you arrive at and leave the building and patient room,    And again after you touch your mask or anything in the room.     Go directly to and from the room you are visiting.     Stay in the patient s room during your visit. Limit going to other places in the hospital as much as possible     Leave bags and jackets at home or in the car.     For everyone s health, please don t come and go during your visit. That includes for smoking   during your visit.

## 2024-12-02 NOTE — H&P
Pre-Operative H & P     CC:  Preoperative exam to assess for increased cardiopulmonary risk while undergoing surgery and anesthesia.    Date of Encounter: 12/2/2024  Primary Care Physician:  Kelsey Giordano     Reason for visit:   Encounter Diagnoses   Name Primary?    Pectus excavatum Yes    Preop examination        HPI  Kellee Turk is a 24 year old female who presents for pre-operative H & P in preparation for  Procedure Information       Case: [id number] Date/Time: 12/09/24 1040    Procedure: REMOVAL, HARDWARE, AFTER PECTUS EXCAVATUM REPAIR (Chest)    Anesthesia type: MAC with Local    Diagnosis: Pectus excavatum [Q67.6]    Pre-op diagnosis: Pectus excavatum [Q67.6]    Location:  OR  /  OR    Providers: Bay Nevarez MD            Patient is being evaluated for comorbid conditions of low BMI and depression.    Ms. Turk has a history of pectus excavatum. She underwent a Modified Ravitch repair with retrosternal bar placement and sternal plating with intercostal nerve cryoablation on 4/24/23. She was counseled by Dr. Obrien and now presents for the above procedure.      History is obtained from the patient and chart review. She is accompanied by her parents.    Hx of abnormal bleeding or anti-platelet use: denies    Menstrual history: Patient's last menstrual period was 11/19/2024.:       Past Medical History  Past Medical History:   Diagnosis Date    Pectus excavatum     Underweight        Past Surgical History  Past Surgical History:   Procedure Laterality Date    DENTAL SURGERY      wisdom teeth    REPAIR PECTUS EXCAVATUM N/A 04/24/2023    Procedure: REPAIR, PECTUS EXCAVATUM, sternal plating, cryoablation intercostal nerve.;  Surgeon: Bay Nevarez MD;  Location: [facility] OR       Prior to Admission Medications  Current Outpatient Medications   Medication Sig Dispense Refill    Multiple Vitamin (MULTIVITAMIN OR) Take by mouth daily (Patient not taking: Reported  on 12/2/2024)         Allergies  Allergies   Allergen Reactions    Amoxicillin Shortness Of Breath and Rash    Watermelon Flavor        Social History  Social History     Socioeconomic History    Marital status: Single     Spouse name: Not on file    Number of children: Not on file    Years of education: Not on file    Highest education level: Not on file   Occupational History    Not on file   Tobacco Use    Smoking status: Never     Passive exposure: Never    Smokeless tobacco: Never   Vaping Use    Vaping status: Never Used   Substance and Sexual Activity    Alcohol use: No     Alcohol/week: 0.0 standard drinks of alcohol    Drug use: No    Sexual activity: Never   Other Topics Concern    Not on file   Social History Narrative    Not on file     Social Drivers of Health     Financial Resource Strain: Not on file   Food Insecurity: Not on file   Transportation Needs: Not on file   Physical Activity: Not on file   Stress: Not on file   Social Connections: Not on file   Interpersonal Safety: Not on file   Housing Stability: Not on file       Family History  Family History   Problem Relation Age of Onset    Arrhythmia Mother         A fib, tachycardia    Factor V Leiden deficiency Mother     Gastrointestinal Disease Father         irritable bowel disease?    Cancer Maternal Grandmother     Dementia Paternal Grandmother     Anesthesia Reaction No family hx of     Deep Vein Thrombosis (DVT) No family hx of        Review of Systems  The complete review of systems is negative other than noted in the HPI or here.     Anesthesia Evaluation   Pt has had prior anesthetic.     No history of anesthetic complications       ROS/MED HX  ENT/Pulmonary:  - neg pulmonary ROS  (-) tobacco use, asthma, sleep apnea and recent URI   Neurologic:  - neg neurologic ROS  (-) no seizures and no CVA   Cardiovascular:     (+)  - -   -  - -                                 Previous cardiac testing   Echo: Date: Results:    Stress Test:  Date:  "2022 Results:  Normal, low-risk exercise stress echocardiogram. Patient with known pectus  excavatum.  85% of the maximum predicted heart rate was achieved (target is 85%). No wall  motion abnormalities at rest or after peak exercise.  Normal LV size and function at baseline. The LVEF is 55-60% at rest and  increased to >70% after exercise with a decrease in LV cavity size. The apical  two-chamber view was difficult to obtain at peak exercise.  Normal heart rate and blood pressure response to exercise.  Average functional capacity. No subjective symptoms to suggest ischemia.  Percent predicted MVO2 85%.  The test was terminated due to fatigue.  No significant valvular or aortic abnormalities were noted on the screening  images. Aortic valve leaflets were not clearly visualized.  No ECG evidence of ischemia at rest or with exercise. Occasional PACs during  stress.  No prior stress test was available for comparison.    ECG Reviewed:  Date: Results:    Cath:  Date: Results:      METS/Exercise Tolerance: 4 - Raking leaves, gardening    Hematologic:  - neg hematologic  ROS  (-) history of blood clots and history of blood transfusion   Musculoskeletal: Comment: Pectus excavatum, s/p repair with retrosternal bar placement and sternal plating 4/2023.       GI/Hepatic:  - neg GI/hepatic ROS  (-) GERD and liver disease   Renal/Genitourinary:  - neg Renal ROS  (-) renal disease   Endo: Comment: Low BMI   (-) Type II DM   Psychiatric/Substance Use:     (+) psychiatric history depression       Infectious Disease:  - neg infectious disease ROS     Malignancy:  - neg malignancy ROS     Other:  - neg other ROS          /61 (BP Location: Right arm, Patient Position: Sitting, Cuff Size: Adult Regular)   Pulse 86   Temp 97.7  F (36.5  C) (Oral)   Resp 16   Ht 1.702 m (5' 7\")   Wt 40.3 kg (88 lb 14.4 oz)   LMP 11/19/2024   SpO2 98%   Breastfeeding No   BMI 13.92 kg/m      Physical Exam  Constitutional: Awake, alert, " cooperative, no apparent distress, and appears stated age. Very slender.  Eyes: Pupils equal, round and reactive to light, extra ocular muscles intact, sclera clear, conjunctiva normal.  HENT: Normocephalic, oral pharynx with moist mucus membranes, good dentition. No goiter appreciated. No removable dental hardware.  Respiratory: Clear to auscultation bilaterally, no crackles or wheezing. No SOB when supine.  Cardiovascular: Regular rate and rhythm, normal S1 and S2, and no murmur noted.  Carotids +2, no bruits. No edema. Palpable pulses to radial, DP and PT arteries.   GI: Normal bowel sounds, soft, non-distended, non-tender, no masses palpated.    Lymph/Hematologic: No cervical lymphadenopathy and no supraclavicular lymphadenopathy.  Genitourinary:  deferred  Skin: Warm and dry.  No rashes.   Musculoskeletal: full ROM of neck. There is no redness, warmth, or swelling of the joints. Gross motor strength is mildly diffusely diminished but symmetric.    Neurologic: Awake, alert, oriented to name, place and time. Cranial nerves II-XII are grossly intact. Gait is normal. Ambulates from chair to exam table, seats self, lies supine and sits back up w/o assistance.  Neuropsychiatric: Calm, cooperative. Normal affect. Pleasant. Answers questions appropriately, follows commands w/o difficulty.        PRIOR LABS/DIAGNOSTIC STUDIES:    All pertinent labs and imaging personally reviewed      Stress test -  please see in ROS above         The patient's records and results personally reviewed by this provider.       LAB/DIAGNOSTIC STUDIES TODAY:  BMP, CBC, T&S    Assessment    Kellee Turk is a 24 year old female seen as a PAC referral for risk assessment and optimization for anesthesia.    Plan/Recommendations  Pt will be optimized for the proposed procedure.  See below for details on the assessment, risk, and preoperative recommendations    NEUROLOGY  - No history of TIA, CVA or seizure    -Post Op delirium risk factors:   "No risk identified    ENT  - No current airway concerns.  Will need to be reassessed day of surgery.  Mallampati: II  TM: > 3    CARDIAC  - No history of CAD, Hypertension, and Afib  - Stress test 2022:  EF 55-60%, no ischemia    - METS (Metabolic Equivalents)  Patient performs 4 or more METS exercise without symptoms             Total Score: 0      RCRI-Very low risk: Class 1 0.4% complication rate             Total Score: 0        PULMONARY  BAKARI Low Risk             Total Score: 0      - Denies asthma or inhaler use  - Tobacco History    History   Smoking Status    Never   Smokeless Tobacco    Never       GI  - Denies GERD  PONV High Risk  Total Score: 3           1 AN PONV: Pt is Female    1 AN PONV: Patient is not a current smoker    1 AN PONV: Intended Post Op Opioids          ENDOCRINE    - BMI: Estimated body mass index is 13.92 kg/m  as calculated from the following:    Height as of this encounter: 1.702 m (5' 7\").    Weight as of this encounter: 40.3 kg (88 lb 14.4 oz).  Underweight (BMI < 18.5)  Will require careful positioning to protect bony prominences.    - No history of Diabetes Mellitus    HEME  VTE Low Risk 0.26%             Total Score: 0      - No history of abnormal bleeding or antiplatelet use.      MSK  Patient is NOT Frail             Total Score: 0      - Pectus excavatum, s/p repair with retrosternal bar placement and sternal plating 4/2023.       The patient is aware that the final anesthesia plan will be decided by the assigned anesthesia provider on the date of service.      The patient is optimized for their procedure. AVS with information on surgery time/arrival time, meds and NPO status given by nursing staff. No further diagnostic testing indicated.      On the day of service:     Prep time: 12 minutes  Visit time: 18 minutes  Documentation time: 11 minutes  ------------------------------------------  Total time: 41 minutes      Toña Beltran PA-C  Preoperative Assessment " [facility]  Phone: [phone]  Fax: [phone]

## 2024-12-09 ENCOUNTER — ANESTHESIA (OUTPATIENT)
Dept: SURGERY | Facility: CLINIC | Age: 24
End: 2024-12-09
Payer: COMMERCIAL

## 2024-12-09 ENCOUNTER — HOSPITAL ENCOUNTER (OUTPATIENT)
Facility: CLINIC | Age: 24
Discharge: HOME | End: 2024-12-09
Attending: THORACIC SURGERY (CARDIOTHORACIC VASCULAR SURGERY) | Admitting: THORACIC SURGERY (CARDIOTHORACIC VASCULAR SURGERY)
Payer: COMMERCIAL

## 2024-12-09 VITALS
OXYGEN SATURATION: 100 % | SYSTOLIC BLOOD PRESSURE: 117 MMHG | BODY MASS INDEX: 14.6 KG/M2 | RESPIRATION RATE: 18 BRPM | HEART RATE: 69 BPM | WEIGHT: 90.83 LBS | DIASTOLIC BLOOD PRESSURE: 76 MMHG | HEIGHT: 66 IN | TEMPERATURE: 97.3 F

## 2024-12-09 DIAGNOSIS — Q67.6 PECTUS EXCAVATUM: Primary | ICD-10-CM

## 2024-12-09 PROCEDURE — 250N000009 HC RX 250: Performed by: THORACIC SURGERY (CARDIOTHORACIC VASCULAR SURGERY)

## 2024-12-09 PROCEDURE — 250N000025 HC SEVOFLURANE, PER MIN: Performed by: THORACIC SURGERY (CARDIOTHORACIC VASCULAR SURGERY)

## 2024-12-09 PROCEDURE — 88300 SURGICAL PATH GROSS: CPT | Mod: 26 | Performed by: PATHOLOGY

## 2024-12-09 PROCEDURE — 272N000001 HC OR GENERAL SUPPLY STERILE: Performed by: THORACIC SURGERY (CARDIOTHORACIC VASCULAR SURGERY)

## 2024-12-09 PROCEDURE — 999N000141 HC STATISTIC PRE-PROCEDURE NURSING ASSESSMENT: Performed by: THORACIC SURGERY (CARDIOTHORACIC VASCULAR SURGERY)

## 2024-12-09 PROCEDURE — 250N000009 HC RX 250: Performed by: NURSE ANESTHETIST, CERTIFIED REGISTERED

## 2024-12-09 PROCEDURE — 710N000012 HC RECOVERY PHASE 2, PER MINUTE: Performed by: THORACIC SURGERY (CARDIOTHORACIC VASCULAR SURGERY)

## 2024-12-09 PROCEDURE — 250N000013 HC RX MED GY IP 250 OP 250 PS 637: Performed by: THORACIC SURGERY (CARDIOTHORACIC VASCULAR SURGERY)

## 2024-12-09 PROCEDURE — 710N000010 HC RECOVERY PHASE 1, LEVEL 2, PER MIN: Performed by: THORACIC SURGERY (CARDIOTHORACIC VASCULAR SURGERY)

## 2024-12-09 PROCEDURE — 88300 SURGICAL PATH GROSS: CPT | Mod: TC | Performed by: THORACIC SURGERY (CARDIOTHORACIC VASCULAR SURGERY)

## 2024-12-09 PROCEDURE — 258N000003 HC RX IP 258 OP 636: Performed by: NURSE ANESTHETIST, CERTIFIED REGISTERED

## 2024-12-09 PROCEDURE — 20680 REMOVAL OF IMPLANT DEEP: CPT | Performed by: THORACIC SURGERY (CARDIOTHORACIC VASCULAR SURGERY)

## 2024-12-09 PROCEDURE — 250N000011 HC RX IP 250 OP 636: Performed by: STUDENT IN AN ORGANIZED HEALTH CARE EDUCATION/TRAINING PROGRAM

## 2024-12-09 PROCEDURE — 250N000011 HC RX IP 250 OP 636: Mod: JZ | Performed by: THORACIC SURGERY (CARDIOTHORACIC VASCULAR SURGERY)

## 2024-12-09 PROCEDURE — 370N000017 HC ANESTHESIA TECHNICAL FEE, PER MIN: Performed by: THORACIC SURGERY (CARDIOTHORACIC VASCULAR SURGERY)

## 2024-12-09 PROCEDURE — 250N000011 HC RX IP 250 OP 636: Performed by: NURSE ANESTHETIST, CERTIFIED REGISTERED

## 2024-12-09 PROCEDURE — 258N000003 HC RX IP 258 OP 636: Performed by: STUDENT IN AN ORGANIZED HEALTH CARE EDUCATION/TRAINING PROGRAM

## 2024-12-09 PROCEDURE — 360N000076 HC SURGERY LEVEL 3, PER MIN: Performed by: THORACIC SURGERY (CARDIOTHORACIC VASCULAR SURGERY)

## 2024-12-09 RX ORDER — DEXAMETHASONE SODIUM PHOSPHATE 4 MG/ML
4 INJECTION, SOLUTION INTRA-ARTICULAR; INTRALESIONAL; INTRAMUSCULAR; INTRAVENOUS; SOFT TISSUE
Status: DISCONTINUED | OUTPATIENT
Start: 2024-12-09 | End: 2024-12-09 | Stop reason: HOSPADM

## 2024-12-09 RX ORDER — EPHEDRINE SULFATE 50 MG/ML
INJECTION, SOLUTION INTRAMUSCULAR; INTRAVENOUS; SUBCUTANEOUS PRN
Status: DISCONTINUED | OUTPATIENT
Start: 2024-12-09 | End: 2024-12-09

## 2024-12-09 RX ORDER — NALOXONE HYDROCHLORIDE 0.4 MG/ML
0.1 INJECTION, SOLUTION INTRAMUSCULAR; INTRAVENOUS; SUBCUTANEOUS
Status: DISCONTINUED | OUTPATIENT
Start: 2024-12-09 | End: 2024-12-09 | Stop reason: HOSPADM

## 2024-12-09 RX ORDER — ACETAMINOPHEN 500 MG
500-1000 TABLET ORAL EVERY 6 HOURS PRN
Qty: 100 TABLET | Refills: 1 | Status: SHIPPED | OUTPATIENT
Start: 2024-12-09

## 2024-12-09 RX ORDER — ONDANSETRON 2 MG/ML
4 INJECTION INTRAMUSCULAR; INTRAVENOUS EVERY 30 MIN PRN
Status: DISCONTINUED | OUTPATIENT
Start: 2024-12-09 | End: 2024-12-09 | Stop reason: HOSPADM

## 2024-12-09 RX ORDER — LIDOCAINE HYDROCHLORIDE 20 MG/ML
INJECTION, SOLUTION INFILTRATION; PERINEURAL PRN
Status: DISCONTINUED | OUTPATIENT
Start: 2024-12-09 | End: 2024-12-09

## 2024-12-09 RX ORDER — IBUPROFEN 600 MG/1
600 TABLET, FILM COATED ORAL EVERY 6 HOURS PRN
Qty: 30 TABLET | Refills: 0 | Status: SHIPPED | OUTPATIENT
Start: 2024-12-09

## 2024-12-09 RX ORDER — FENTANYL CITRATE 50 UG/ML
INJECTION, SOLUTION INTRAMUSCULAR; INTRAVENOUS PRN
Status: DISCONTINUED | OUTPATIENT
Start: 2024-12-09 | End: 2024-12-09

## 2024-12-09 RX ORDER — FENTANYL CITRATE 50 UG/ML
50 INJECTION, SOLUTION INTRAMUSCULAR; INTRAVENOUS EVERY 5 MIN PRN
Status: DISCONTINUED | OUTPATIENT
Start: 2024-12-09 | End: 2024-12-09 | Stop reason: HOSPADM

## 2024-12-09 RX ORDER — ACETAMINOPHEN 325 MG/1
975 TABLET ORAL ONCE
Status: COMPLETED | OUTPATIENT
Start: 2024-12-09 | End: 2024-12-09

## 2024-12-09 RX ORDER — CLINDAMYCIN PHOSPHATE 900 MG/50ML
900 INJECTION, SOLUTION INTRAVENOUS SEE ADMIN INSTRUCTIONS
Status: DISCONTINUED | OUTPATIENT
Start: 2024-12-09 | End: 2024-12-09 | Stop reason: HOSPADM

## 2024-12-09 RX ORDER — OXYCODONE HYDROCHLORIDE 5 MG/1
5 TABLET ORAL
Status: DISCONTINUED | OUTPATIENT
Start: 2024-12-09 | End: 2024-12-09 | Stop reason: HOSPADM

## 2024-12-09 RX ORDER — ONDANSETRON 4 MG/1
4 TABLET, ORALLY DISINTEGRATING ORAL EVERY 30 MIN PRN
Status: DISCONTINUED | OUTPATIENT
Start: 2024-12-09 | End: 2024-12-09 | Stop reason: HOSPADM

## 2024-12-09 RX ORDER — OXYCODONE HYDROCHLORIDE 10 MG/1
10 TABLET ORAL
Status: DISCONTINUED | OUTPATIENT
Start: 2024-12-09 | End: 2024-12-09 | Stop reason: HOSPADM

## 2024-12-09 RX ORDER — FENTANYL CITRATE 50 UG/ML
25 INJECTION, SOLUTION INTRAMUSCULAR; INTRAVENOUS EVERY 5 MIN PRN
Status: DISCONTINUED | OUTPATIENT
Start: 2024-12-09 | End: 2024-12-09 | Stop reason: HOSPADM

## 2024-12-09 RX ORDER — HYDROMORPHONE HCL IN WATER/PF 6 MG/30 ML
0.4 PATIENT CONTROLLED ANALGESIA SYRINGE INTRAVENOUS EVERY 5 MIN PRN
Status: DISCONTINUED | OUTPATIENT
Start: 2024-12-09 | End: 2024-12-09 | Stop reason: HOSPADM

## 2024-12-09 RX ORDER — ACETAMINOPHEN 325 MG/1
650 TABLET ORAL ONCE
Status: DISCONTINUED | OUTPATIENT
Start: 2024-12-09 | End: 2024-12-09

## 2024-12-09 RX ORDER — SODIUM CHLORIDE, SODIUM LACTATE, POTASSIUM CHLORIDE, CALCIUM CHLORIDE 600; 310; 30; 20 MG/100ML; MG/100ML; MG/100ML; MG/100ML
INJECTION, SOLUTION INTRAVENOUS CONTINUOUS
Status: DISCONTINUED | OUTPATIENT
Start: 2024-12-09 | End: 2024-12-09 | Stop reason: HOSPADM

## 2024-12-09 RX ORDER — CLINDAMYCIN PHOSPHATE 900 MG/50ML
900 INJECTION, SOLUTION INTRAVENOUS
Status: COMPLETED | OUTPATIENT
Start: 2024-12-09 | End: 2024-12-09

## 2024-12-09 RX ORDER — SODIUM CHLORIDE, SODIUM LACTATE, POTASSIUM CHLORIDE, CALCIUM CHLORIDE 600; 310; 30; 20 MG/100ML; MG/100ML; MG/100ML; MG/100ML
INJECTION, SOLUTION INTRAVENOUS CONTINUOUS PRN
Status: DISCONTINUED | OUTPATIENT
Start: 2024-12-09 | End: 2024-12-09

## 2024-12-09 RX ORDER — HYDROMORPHONE HCL IN WATER/PF 6 MG/30 ML
0.2 PATIENT CONTROLLED ANALGESIA SYRINGE INTRAVENOUS EVERY 5 MIN PRN
Status: DISCONTINUED | OUTPATIENT
Start: 2024-12-09 | End: 2024-12-09 | Stop reason: HOSPADM

## 2024-12-09 RX ORDER — PROPOFOL 10 MG/ML
INJECTION, EMULSION INTRAVENOUS PRN
Status: DISCONTINUED | OUTPATIENT
Start: 2024-12-09 | End: 2024-12-09

## 2024-12-09 RX ORDER — METHOCARBAMOL 500 MG/1
500 TABLET, FILM COATED ORAL 4 TIMES DAILY PRN
Qty: 40 TABLET | Refills: 0 | Status: SHIPPED | OUTPATIENT
Start: 2024-12-09 | End: 2024-12-19

## 2024-12-09 RX ADMIN — FOSAPREPITANT 150 MG: 150 INJECTION, POWDER, LYOPHILIZED, FOR SOLUTION INTRAVENOUS at 10:04

## 2024-12-09 RX ADMIN — MIDAZOLAM 2 MG: 1 INJECTION INTRAMUSCULAR; INTRAVENOUS at 11:35

## 2024-12-09 RX ADMIN — ONDANSETRON 4 MG: 2 INJECTION INTRAMUSCULAR; INTRAVENOUS at 13:55

## 2024-12-09 RX ADMIN — FENTANYL CITRATE 50 MCG: 50 INJECTION INTRAMUSCULAR; INTRAVENOUS at 11:46

## 2024-12-09 RX ADMIN — PROPOFOL 100 MG: 10 INJECTION, EMULSION INTRAVENOUS at 11:48

## 2024-12-09 RX ADMIN — EPHEDRINE SULFATE 10 MG: 5 INJECTION INTRAVENOUS at 11:58

## 2024-12-09 RX ADMIN — CLINDAMYCIN IN 5 PERCENT DEXTROSE 900 MG: 18 INJECTION, SOLUTION INTRAVENOUS at 09:10

## 2024-12-09 RX ADMIN — Medication 50 MG: at 12:20

## 2024-12-09 RX ADMIN — Medication 30 MG: at 11:48

## 2024-12-09 RX ADMIN — ACETAMINOPHEN 975 MG: 325 TABLET, FILM COATED ORAL at 09:09

## 2024-12-09 RX ADMIN — LIDOCAINE HYDROCHLORIDE 40 MG: 20 INJECTION, SOLUTION INFILTRATION; PERINEURAL at 11:46

## 2024-12-09 RX ADMIN — SODIUM CHLORIDE, POTASSIUM CHLORIDE, SODIUM LACTATE AND CALCIUM CHLORIDE: 600; 310; 30; 20 INJECTION, SOLUTION INTRAVENOUS at 11:36

## 2024-12-09 ASSESSMENT — ACTIVITIES OF DAILY LIVING (ADL)
ADLS_ACUITY_SCORE: 26
ADLS_ACUITY_SCORE: 26
ADLS_ACUITY_SCORE: 27
ADLS_ACUITY_SCORE: 26

## 2024-12-09 ASSESSMENT — LIFESTYLE VARIABLES: TOBACCO_USE: 0

## 2024-12-09 ASSESSMENT — ENCOUNTER SYMPTOMS: SEIZURES: 0

## 2024-12-09 NOTE — ANESTHESIA POSTPROCEDURE EVALUATION
Patient: Kellee Turk    Procedure: Procedure(s):  REMOVAL, HARDWARE, AFTER PECTUS EXCAVATUM REPAIR       Anesthesia Type:  General    Note:  Disposition: Outpatient   Postop Pain Control: Uneventful            Sign Out: Well controlled pain   PONV: No   Neuro/Psych: Uneventful            Sign Out: Acceptable/Baseline neuro status   Airway/Respiratory: Uneventful            Sign Out: Acceptable/Baseline resp. status   CV/Hemodynamics: Uneventful            Sign Out: Acceptable CV status; No obvious hypovolemia; No obvious fluid overload   Other NRE: NONE   DID A NON-ROUTINE EVENT OCCUR? No           Last vitals:  Vitals Value Taken Time   /67 12/09/24 1330   Temp 36.8  C (98.2  F) 12/09/24 1245   Pulse 95 12/09/24 1335   Resp 16 12/09/24 1335   SpO2 98 % 12/09/24 1335   Vitals shown include unfiled device data.    Electronically Signed By: Fidencio Castaneda MD  December 9, 2024  1:35 PM

## 2024-12-09 NOTE — ANESTHESIA PREPROCEDURE EVALUATION
Pre-Operative H & P       HPI  Kellee Turk is a 24 year old female who presents for pre-operative H & P in preparation for  Procedure Information       Case: [id number] Date/Time: 12/09/24 1040    Procedure: REMOVAL, HARDWARE, AFTER PECTUS EXCAVATUM REPAIR (Chest)    Anesthesia type: MAC with Local    Diagnosis: Pectus excavatum [Q67.6]    Pre-op diagnosis: Pectus excavatum [Q67.6]    Location:  OR 23 /  OR    Providers: Bay Nevarez MD            Patient is being evaluated for comorbid conditions of low BMI and depression.    Ms. Turk has a history of pectus excavatum. She underwent a Modified Ravitch repair with retrosternal bar placement and sternal plating with intercostal nerve cryoablation on 4/24/23. She was counseled by Dr. Obrien and now presents for the above procedure.      History is obtained from the patient and chart review. She is accompanied by her parents.    Hx of abnormal bleeding or anti-platelet use: denies    Menstrual history: No LMP recorded.:       Past Medical History  Past Medical History:   Diagnosis Date    Pectus excavatum     Underweight        Past Surgical History  Past Surgical History:   Procedure Laterality Date    DENTAL SURGERY      wisdom teeth    REPAIR PECTUS EXCAVATUM N/A 04/24/2023    Procedure: REPAIR, PECTUS EXCAVATUM, sternal plating, cryoablation intercostal nerve.;  Surgeon: Bay Nevarez MD;  Location: [facility] OR       Prior to Admission Medications  No current outpatient medications on file.       Allergies  Allergies   Allergen Reactions    Amoxicillin Shortness Of Breath and Rash    Watermelon Flavor        Social History  Social History     Socioeconomic History    Marital status: Single     Spouse name: Not on file    Number of children: Not on file    Years of education: Not on file    Highest education level: Not on file   Occupational History    Not on file   Tobacco Use    Smoking status: Never     Passive exposure: Never     Smokeless tobacco: Never   Vaping Use    Vaping status: Never Used   Substance and Sexual Activity    Alcohol use: No     Alcohol/week: 0.0 standard drinks of alcohol    Drug use: No    Sexual activity: Never   Other Topics Concern    Not on file   Social History Narrative    Not on file     Social Drivers of Health     Financial Resource Strain: Not on file   Food Insecurity: Not on file   Transportation Needs: Not on file   Physical Activity: Not on file   Stress: Not on file   Social Connections: Not on file   Interpersonal Safety: Low Risk  (12/9/2024)    Interpersonal Safety     Do you feel physically and emotionally safe where you currently live?: Yes     Within the past 12 months, have you been hit, slapped, kicked or otherwise physically hurt by someone?: No     Within the past 12 months, have you been humiliated or emotionally abused in other ways by your partner or ex-partner?: No   Housing Stability: Not on file       Family History  Family History   Problem Relation Age of Onset    Arrhythmia Mother         A fib, tachycardia    Factor V Leiden deficiency Mother     Gastrointestinal Disease Father         irritable bowel disease?    Cancer Maternal Grandmother     Dementia Paternal Grandmother     Anesthesia Reaction No family hx of     Deep Vein Thrombosis (DVT) No family hx of        Review of Systems  The complete review of systems is negative other than noted in the HPI or here.     Anesthesia Evaluation   Pt has had prior anesthetic. Type: General.    No history of anesthetic complications       ROS/MED HX  ENT/Pulmonary:  - neg pulmonary ROS  (-) tobacco use, asthma, sleep apnea and recent URI   Neurologic:  - neg neurologic ROS  (-) no seizures and no CVA   Cardiovascular:     (+)  - -   -  - -                                 Previous cardiac testing   Echo: Date: Results:    Stress Test:  Date: 2022 Results:  Normal, low-risk exercise stress echocardiogram. Patient with known  "pectus  excavatum.  85% of the maximum predicted heart rate was achieved (target is 85%). No wall  motion abnormalities at rest or after peak exercise.  Normal LV size and function at baseline. The LVEF is 55-60% at rest and  increased to >70% after exercise with a decrease in LV cavity size. The apical  two-chamber view was difficult to obtain at peak exercise.  Normal heart rate and blood pressure response to exercise.  Average functional capacity. No subjective symptoms to suggest ischemia.  Percent predicted MVO2 85%.  The test was terminated due to fatigue.  No significant valvular or aortic abnormalities were noted on the screening  images. Aortic valve leaflets were not clearly visualized.  No ECG evidence of ischemia at rest or with exercise. Occasional PACs during  stress.  No prior stress test was available for comparison.    ECG Reviewed:  Date: Results:    Cath:  Date: Results:      METS/Exercise Tolerance: 4 - Raking leaves, gardening    Hematologic:  - neg hematologic  ROS  (-) history of blood clots and history of blood transfusion   Musculoskeletal: Comment: Pectus excavatum, s/p repair with retrosternal bar placement and sternal plating 4/2023.       GI/Hepatic:  - neg GI/hepatic ROS  (-) GERD and liver disease   Renal/Genitourinary:  - neg Renal ROS  (-) renal disease   Endo: Comment: Low BMI   (-) Type II DM   Psychiatric/Substance Use:     (+) psychiatric history depression       Infectious Disease:  - neg infectious disease ROS     Malignancy:  - neg malignancy ROS     Other:  - neg other ROS          /83 (BP Location: Left arm)   Pulse 79   Temp 36.7  C (98.1  F) (Oral)   Resp 16   Ht 1.676 m (5' 6\")   Wt 41.2 kg (90 lb 13.3 oz)   SpO2 100%   BMI 14.66 kg/m            PRIOR LABS/DIAGNOSTIC STUDIES:    All pertinent labs and imaging personally reviewed      Stress test -  please see in ROS above         The patient's records and results personally reviewed by this provider. " "      LAB/DIAGNOSTIC STUDIES TODAY:  BMP, CBC, T&S    Assessment    Kellee Turk is a 24 year old female seen as a PAC referral for risk assessment and optimization for anesthesia.    Plan/Recommendations  Pt will be optimized for the proposed procedure.  See below for details on the assessment, risk, and preoperative recommendations    NEUROLOGY  - No history of TIA, CVA or seizure    -Post Op delirium risk factors:  No risk identified    ENT  - No current airway concerns.  Will need to be reassessed day of surgery.  Mallampati: II  TM: > 3    CARDIAC  - No history of CAD, Hypertension, and Afib  - Stress test 2022:  EF 55-60%, no ischemia    - METS (Metabolic Equivalents)  Patient performs 4 or more METS exercise without symptoms             Total Score: 0      RCRI-Very low risk: Class 1 0.4% complication rate             Total Score: 0        PULMONARY  BAKARI Low Risk             Total Score: 0      - Denies asthma or inhaler use  - Tobacco History    History   Smoking Status    Never   Smokeless Tobacco    Never       GI  - Denies GERD  PONV High Risk  Total Score: 3           1 AN PONV: Pt is Female    1 AN PONV: Patient is not a current smoker    1 AN PONV: Intended Post Op Opioids          ENDOCRINE    - BMI: Estimated body mass index is 14.66 kg/m  as calculated from the following:    Height as of this encounter: 1.676 m (5' 6\").    Weight as of this encounter: 41.2 kg (90 lb 13.3 oz).  Underweight (BMI < 18.5)  Will require careful positioning to protect bony prominences.    - No history of Diabetes Mellitus    HEME  VTE Low Risk 0.26%             Total Score: 0      - No history of abnormal bleeding or antiplatelet use.      MSK  Patient is NOT Frail             Total Score: 0      - Pectus excavatum, s/p repair with retrosternal bar placement and sternal plating 4/2023.       Physical Exam    Airway        Mallampati: II   TM distance: > 3 FB   Neck ROM: full   Mouth opening: > 3 cm    Respiratory " Devices and Support         Dental       (+) Completely normal teeth      Cardiovascular   cardiovascular exam normal       Rhythm and rate: regular and normal     Pulmonary   pulmonary exam normal        breath sounds clear to auscultation           Anesthesia Plan    ASA Status:  2    NPO Status:  NPO Appropriate    Anesthesia Type: General.     - Airway: ETT   Induction: Intravenous, Propofol.   Maintenance: Balanced.   Techniques and Equipment:     - Lines/Monitors: BIS     - Blood: T&S     Consents    Anesthesia Plan(s) and associated risks, benefits, and realistic alternatives discussed. Questions answered and patient/representative(s) expressed understanding.     - Discussed: Risks, Benefits and Alternatives for BOTH SEDATION and the PROCEDURE were discussed     - Discussed with:  Patient      - Extended Intubation/Ventilatory Support Discussed: No.      - Patient is DNR/DNI Status: No     Use of blood products discussed: Yes.     - Discussed with: Patient.     - Consented: consented to blood products     Postoperative Care    Pain management: IV analgesics, Oral pain medications, Multi-modal analgesia.   PONV prophylaxis: Ondansetron (or other 5HT-3), Dexamethasone or Solumedrol, Aprepitant     Comments:    Other Comments: 23 yo female PMHx pectus excavatum planned for nely bar removal. Plan GETA with standard ASA monitors, BIS, 2nd PIV.

## 2024-12-09 NOTE — ANESTHESIA PROCEDURE NOTES
Airway       Patient location during procedure: OR       Procedure Start/Stop Times: 12/9/2024 11:50 AM  Staff -        CRNA: Vishnu Barnett APRN CRNA       Performed By: CRNA  Consent for Airway        Urgency: elective  Indications and Patient Condition       Indications for airway management: sonali-procedural         Mask difficulty assessment: 2 - vent by mask + OA or adjuvant +/- NMBA    Final Airway Details       Final airway type: endotracheal airway       Successful airway: ETT - single  Endotracheal Airway Details        ETT size (mm): 6.0       Cuffed: yes       Successful intubation technique: direct laryngoscopy       DL Blade Type: Singh 2       Grade View of Cords: 1       Adjucts: stylet       Position: Center       Measured from: gums/teeth       Secured at (cm): 20       Bite block used: None    Post intubation assessment        Placement verified by: capnometry        Number of attempts at approach: 1       Secured with: tape       Ease of procedure: easy       Dentition: Intact and Unchanged    Medication(s) Administered   Medication Administration Time: 12/9/2024 11:50 AM

## 2024-12-09 NOTE — BRIEF OP NOTE
[facility]    Brief Operative Note    Pre-operative diagnosis: Pectus excavatum [Q67.6]  Post-operative diagnosis Same as pre-operative diagnosis    Procedure: REMOVAL, HARDWARE, AFTER PECTUS EXCAVATUM REPAIR, N/A - Chest    Surgeon: Surgeons and Role:     * Bay Nevarez MD - Primary     * Yamileth Mtz MD - Resident - Assisting  Anesthesia: MAC with Local   Estimated Blood Loss: Minimal     Drains: None  Specimens:   ID Type Source Tests Collected by Time Destination   1 : Sternal Plate Implant Explant OR DOCUMENTATION ONLY, SURGICAL PATHOLOGY EXAM Bay Nevarez MD 12/9/2024 12:20 PM      Findings:   Bar removed without issues.  Complications: None.  Implants:   Implant Name Type Inv. Item Serial No.  Lot No. LRB No. Used Action   LEVEL ONE THORACIC, PLATE, STERNAL, LOCKING, Y SHP, 2.3 MM S - [id number] Metallic Hardware/[place] LEVEL ONE THORACIC, PLATE, STERNAL, LOCKING, Y SHP, 2.3 MM S  [facility] MEDICAL 00 N/A 1 Explanted     Plan:   - to home from PACU  - [facility]   - Ok to eat   - Follow up in clinic Jan 8 2025    Yamileth Mtz MD PGY3  General Surgery Resident

## 2024-12-09 NOTE — OP NOTE
OPERATIVE REPORT    PREOP DIAGNOSIS: Pectus Excavatum s/p Ravitch with retrosternal bar.     POSTOP DIAGNOSIS: Same     PROCEDURE PERFORMED: Planned retrosternal bar removal.     SURGEON: Bay Obrien MD    ANESTHESIA: General     COMPLICATIONS: None      EBL: 10 ML    DRAINS: None    SPECIMENS; None     PROCEDURE IN DETAIL;   The patient was taken to the OR and laid supine. General anesthesia was induced. A time out was performed confirming the name of the patient and correct procedure. SCDs were in place and working prior to induction of anesthesia. The chest was prepped with Chloraprep and draped in sterile fashion. She received antibiotics within 30 min of incision.     We made a 2 cm incision on the right lateral end of her previous scar. We dissected down to the chest wall and identified the retrosternal bar. We freed up the edge of the bar, grasped it with surgical pliers and pulled it out without difficulty.     We confirmed hemostasis, irrigated and closed with absorbable suture. We applied Dermabond to the skin incision.     The patient tolerated the procedure well, all instruments and sponge counts were correct.     The patient was extubated and transferred to PACU for recovery.     Bay Obrien MD

## 2024-12-09 NOTE — ANESTHESIA CARE TRANSFER NOTE
Patient: Kellee Turk    Procedure: Procedure(s):  REMOVAL, HARDWARE, AFTER PECTUS EXCAVATUM REPAIR       Diagnosis: Pectus excavatum [Q67.6]  Diagnosis Additional Information: No value filed.    Anesthesia Type:   General     Note:    Oropharynx: oral airway in place and spontaneously breathing  Level of Consciousness: drowsy  Oxygen Supplementation: nasal cannula  Level of Supplemental Oxygen (L/min / FiO2): 4  Independent Airway: airway patency satisfactory and stable  Dentition: dentition unchanged  Vital Signs Stable: post-procedure vital signs reviewed and stable  Report to RN Given: handoff report given  Patient transferred to: PACU  Comments: OA remains in place. FM added over NC in PAR   Handoff Report: Identifed the Patient, Identified the Reponsible Provider, Reviewed the pertinent medical history, Discussed the surgical course, Reviewed Intra-OP anesthesia mangement and issues during anesthesia, Set expectations for post-procedure period and Allowed opportunity for questions and acknowledgement of understanding      Vitals:  Vitals Value Taken Time   /56 12/09/24 1247   Temp     Pulse 128 12/09/24 1258   Resp 26 12/09/24 1258   SpO2 100 % 12/09/24 1258   Vitals shown include unfiled device data.    Electronically Signed By: MARISSA Trujillo CRNA  December 9, 2024  12:58 PM

## 2024-12-09 NOTE — DISCHARGE INSTRUCTIONS
Contacting your Doctor -   To contact a doctor, call  [phone] and ask for the resident on call for Thoracic Surgery (answered 24 hours a day)   Emergency Department:  [facility]: [phone]  [facility]: [phone] 911 if you are in need of immediate or emergent help

## 2024-12-11 ENCOUNTER — PATIENT OUTREACH (OUTPATIENT)
Dept: SURGERY | Facility: CLINIC | Age: 24
End: 2024-12-11
Payer: COMMERCIAL

## 2024-12-11 NOTE — TELEPHONE ENCOUNTER
Post Op Discharge Call    Surgery: Planned retrosternal bar removal.     Surgery date: 12/09/2024    Discharge Date:  12/10/2024    Immediate Concerns: None at this time    Pain:  No concerns with pain management.   Using pain medications as recommended with appropriate relief.   Discussed use of ice and heat, recommended alternating between the two and using 20 mins on and 20 mins off.  Reports that she is taking scheduled Tylenol and Ibuprofen (alternating between the 2 every 3 hours). Taking Methocarbamol.     Rates pain 4/10, but tolerable. States that it feels sore. Inquiring what was all entailed for the surgery. Reports that the soreness is all the way across the chest and feels like there may have been screws on the other side (not at incision site).    Incision:   No concerns, healing well, no redness, drainage or edema reported.     Drains:   No drain.     Diet:   Regular diet     Bowels:   Passing gas.   Patient reports she has had bowel movement post op.  Not currently taking stool softeners. Reports that today was the first BM she has had since surgery. Spoke with patient about the importance of constipation prevention and encouraged patient to take Miralax daily the first week after surgery.     Activity:   No difficulty with ADLs reported.   Patient is up independently at home.   Encourage patient to continue to stay active.     Post op/follow up plans:   Post op appointment scheduled,confirmed date and time with patient.       Future Appointments   Date Time Provider Department Center   1/8/2025  1:25 PM [id number]   1/8/2025  2:00 PM Bay Nevarez MD [facility]       Patient with no additional questions or concerns at this time. Writer will review her question with Dr Wood and get back to her with his response.   Patient has our direct number for any questions or concerns that may arise.      Josey Rogel RN, BSN  Thoracic Surgery RN Care Coordinator

## 2024-12-11 NOTE — TELEPHONE ENCOUNTER
Called patient to inform her that writer spoke with Dr Wood and the soreness on the opposite side of the incision is normal and to be expected. Explained that the bar is sutured in on both sides. The sutures are dissolvable, but create some scar tissue and therefore when the bar is removed from once side the scar tissue is pulled causing inflammation in that area (even though there is no incision). Recommended continuing with current pain medication regimen and apply ice 3-4 times a day.  Patient verbalized her understanding and appreciated writer's follow up call.     Josey Rogel RN, BSN  Thoracic Surgery RN Care Coordinator

## 2024-12-17 LAB
PATH REPORT.COMMENTS IMP SPEC: NORMAL
PATH REPORT.COMMENTS IMP SPEC: NORMAL
PATH REPORT.FINAL DX SPEC: NORMAL
PATH REPORT.GROSS SPEC: NORMAL
PATH REPORT.MICROSCOPIC SPEC OTHER STN: NORMAL
PATH REPORT.RELEVANT HX SPEC: NORMAL
PHOTO IMAGE: NORMAL

## 2025-01-07 NOTE — PROGRESS NOTES
THORACIC SURGERY FOLLOW UP VISIT    Dear Dr. Nanette Hunter,  I saw Ms. Kellee Turk in follow-up today. The clinical summary follows:     PREOP DIAGNOSIS   Pectus excavatum s/p modified Ravitch repair.   PROCEDURE   04/24/2023  Modified Ravitch repair with retrosternal bar placement and sternal plating. Intercostal nerve cryoablation.   12/09/24 Planned retrosternal bar removal     COMPLICATIONS  None    INTERVAL STUDIES  CXR       Past Medical History:   Diagnosis Date    Pectus excavatum     Underweight      Past Surgical History:   Procedure Laterality Date    DENTAL SURGERY      wisdom teeth    REMOVE HARDWARE PECTUS RAVITCH N/A 12/9/2024    Procedure: REMOVAL, HARDWARE, AFTER PECTUS EXCAVATUM REPAIR;  Surgeon: Bay Nevarez MD;  Location: [facility] OR    REPAIR PECTUS EXCAVATUM N/A 04/24/2023    Procedure: REPAIR, PECTUS EXCAVATUM, sternal plating, cryoablation intercostal nerve.;  Surgeon: Bay Nevarez MD;  Location: [facility] OR        Allergies   Allergen Reactions    Amoxicillin Shortness Of Breath and Rash    Watermelon Flavoring Agent (Non-Screening)      Current Outpatient Medications   Medication Sig Dispense Refill    acetaminophen (TYLENOL) 500 MG tablet Take 1-2 tablets (500-1,000 mg) by mouth every 6 hours as needed for mild pain. 100 tablet 1    ibuprofen (ADVIL/MOTRIN) 600 MG tablet Take 1 tablet (600 mg) by mouth every 6 hours as needed for moderate pain. 30 tablet 0    Multiple Vitamin (MULTIVITAMIN OR) Take by mouth daily.       No current facility-administered medications for this visit.     Social History     Tobacco Use    Smoking status: Never     Passive exposure: Never    Smokeless tobacco: Never   Vaping Use    Vaping status: Never Used   Substance Use Topics    Alcohol use: No     Alcohol/week: 0.0 standard drinks of alcohol    Drug use: No     Exam   /85 (BP Location: Right arm, Patient Position: Sitting, Cuff Size: Adult Small)   Pulse 89   Temp 97.8  F (36.6  C)  (Oral)   Resp 12   Wt 39.1 kg (86 lb 1.6 oz)   LMP 11/19/2024   SpO2 98%   BMI 13.90 kg/m    Alert and oriented NAD  Bilateral breath sounds.    SUBJECTIVE  Kellee comes to clinic today for a follow up. She is doing great! No complaints.      From a personal perspective, she comes to clinic with her dad.     IMPRESSION  24 year old female with pectus excavatum s/p Ravitch and retrosternal bar removal.     PLAN  I spent 30 min on the date of the encounter in chart review, patient visit, review of tests, documentation and/or discussion with other providers about the issues documented above. I reviewed the plan as follows:  Kellee expressed her interest in meeting with plastic surgery given her recent surgery which significantly impacted her chest wall structure. I do believe this would be a great time to discuss the options for breast augmentation. I will make a referral to Dr Yancey from plastic surgery.  As requested, we gave her the retrosternal bar that was removed.   F/up with thoracic surgery PRN.   All questions were answered and the patient and present family were in agreement with the plan.  I appreciate the opportunity to participate in the care of your patient and will keep you updated.  Sincerely,  Bay Obrien MD

## 2025-01-08 ENCOUNTER — ANCILLARY PROCEDURE (OUTPATIENT)
Dept: GENERAL RADIOLOGY | Facility: CLINIC | Age: 25
End: 2025-01-08
Attending: CLINICAL NURSE SPECIALIST
Payer: COMMERCIAL

## 2025-01-08 ENCOUNTER — ONCOLOGY VISIT (OUTPATIENT)
Dept: SURGERY | Facility: CLINIC | Age: 25
End: 2025-01-08
Attending: THORACIC SURGERY (CARDIOTHORACIC VASCULAR SURGERY)
Payer: COMMERCIAL

## 2025-01-08 VITALS
HEART RATE: 89 BPM | OXYGEN SATURATION: 98 % | DIASTOLIC BLOOD PRESSURE: 85 MMHG | WEIGHT: 86.1 LBS | BODY MASS INDEX: 13.9 KG/M2 | SYSTOLIC BLOOD PRESSURE: 118 MMHG | TEMPERATURE: 97.8 F | RESPIRATION RATE: 12 BRPM

## 2025-01-08 DIAGNOSIS — Q67.6 PECTUS EXCAVATUM: Primary | ICD-10-CM

## 2025-01-08 DIAGNOSIS — Q67.6 PECTUS EXCAVATUM: ICD-10-CM

## 2025-01-08 PROCEDURE — 71046 X-RAY EXAM CHEST 2 VIEWS: CPT | Mod: GC | Performed by: RADIOLOGY

## 2025-01-08 PROCEDURE — 99213 OFFICE O/P EST LOW 20 MIN: CPT | Performed by: THORACIC SURGERY (CARDIOTHORACIC VASCULAR SURGERY)

## 2025-01-08 PROCEDURE — 99024 POSTOP FOLLOW-UP VISIT: CPT | Performed by: THORACIC SURGERY (CARDIOTHORACIC VASCULAR SURGERY)

## 2025-01-08 ASSESSMENT — PAIN SCALES - GENERAL: PAINLEVEL_OUTOF10: NO PAIN (0)

## 2025-01-08 NOTE — NURSING NOTE
"Oncology Rooming Note    January 8, 2025 1:53 PM   Kellee Turk is a 24 year old female who presents for:    Chief Complaint   Patient presents with    Oncology Clinic Visit     Pectus excavatum     Initial Vitals: /85 (BP Location: Right arm, Patient Position: Sitting, Cuff Size: Adult Small)   Pulse 89   Temp 97.8  F (36.6  C) (Oral)   Resp 12   Wt 39.1 kg (86 lb 1.6 oz)   LMP 11/19/2024   SpO2 98%   BMI 13.90 kg/m   Estimated body mass index is 13.9 kg/m  as calculated from the following:    Height as of 12/9/24: 1.676 m (5' 6\").    Weight as of this encounter: 39.1 kg (86 lb 1.6 oz). Body surface area is 1.35 meters squared.  No Pain (0) Comment: Data Unavailable   Patient's last menstrual period was 11/19/2024.  Allergies reviewed: Yes  Medications reviewed: Yes    Medications: Medication refills not needed today.  Pharmacy name entered into [employer]:    [id number] [place] [facility] [address] [id number] [address] AT [facility]  [address]    Frailty Screening:   Is the patient here for a new oncology consult visit in cancer care? 2. No      Clinical concerns: Patient states no new concerns to discuss with provider.        Arya Lee, EMT            "

## 2025-01-08 NOTE — LETTER
1/8/2025      Kellee Turk  [address] [address] [id number]      Dear Colleague,    Thank you for referring your patient, Kellee Turk, to the [facility]. Please see a copy of my visit note below.    THORACIC SURGERY FOLLOW UP VISIT    Dear Dr. Nanette Hunter,  I saw Ms. Kellee Turk in follow-up today. The clinical summary follows:     PREOP DIAGNOSIS   Pectus excavatum s/p modified Ravitch repair.   PROCEDURE   04/24/2023  Modified Ravitch repair with retrosternal bar placement and sternal plating. Intercostal nerve cryoablation.   12/09/24 Planned retrosternal bar removal     COMPLICATIONS  None    INTERVAL STUDIES  CXR       Past Medical History:   Diagnosis Date     Pectus excavatum      Underweight      Past Surgical History:   Procedure Laterality Date     DENTAL SURGERY      wisdom teeth     REMOVE HARDWARE PECTUS RAVITCH N/A 12/9/2024    Procedure: REMOVAL, HARDWARE, AFTER PECTUS EXCAVATUM REPAIR;  Surgeon: Bay Nevarez MD;  Location: [facility] OR     REPAIR PECTUS EXCAVATUM N/A 04/24/2023    Procedure: REPAIR, PECTUS EXCAVATUM, sternal plating, cryoablation intercostal nerve.;  Surgeon: Bay Nevarez MD;  Location: [facility] OR        Allergies   Allergen Reactions     Amoxicillin Shortness Of Breath and Rash     Watermelon Flavoring Agent (Non-Screening)      Current Outpatient Medications   Medication Sig Dispense Refill     acetaminophen (TYLENOL) 500 MG tablet Take 1-2 tablets (500-1,000 mg) by mouth every 6 hours as needed for mild pain. 100 tablet 1     ibuprofen (ADVIL/MOTRIN) 600 MG tablet Take 1 tablet (600 mg) by mouth every 6 hours as needed for moderate pain. 30 tablet 0     Multiple Vitamin (MULTIVITAMIN OR) Take by mouth daily.       No current facility-administered medications for this visit.     Social History     Tobacco Use     Smoking status: Never     Passive exposure: Never     Smokeless tobacco: Never   Vaping Use      Vaping status: Never Used   Substance Use Topics     Alcohol use: No     Alcohol/week: 0.0 standard drinks of alcohol     Drug use: No     Exam   /85 (BP Location: Right arm, Patient Position: Sitting, Cuff Size: Adult Small)   Pulse 89   Temp 97.8  F (36.6  C) (Oral)   Resp 12   Wt 39.1 kg (86 lb 1.6 oz)   LMP 11/19/2024   SpO2 98%   BMI 13.90 kg/m    Alert and oriented NAD  Bilateral breath sounds.    SUBJECTIVE  Kellee comes to clinic today for a follow up. She is doing great! No complaints.      From a personal perspective, she comes to clinic with her dad.     IMPRESSION  24 year old female with pectus excavatum s/p Ravitch and retrosternal bar removal.     PLAN  I spent 30 min on the date of the encounter in chart review, patient visit, review of tests, documentation and/or discussion with other providers about the issues documented above. I reviewed the plan as follows:  Kellee expressed her interest in meeting with plastic surgery given her recent surgery which significantly impacted her chest wall structure. I do believe this would be a great time to discuss the options for breast augmentation. I will make a referral to Dr Yancey from plastic surgery.  As requested, we gave her the retrosternal bar that was removed.   F/up with thoracic surgery PRN.   All questions were answered and the patient and present family were in agreement with the plan.  I appreciate the opportunity to participate in the care of your patient and will keep you updated.  Sincerely,  Bay Obrien MD      Again, thank you for allowing me to participate in the care of your patient.        Sincerely,        Fernando Wood MD    Electronically signed

## 2025-01-10 DIAGNOSIS — Q67.6 PECTUS EXCAVATUM: Primary | ICD-10-CM

## 2025-01-10 DIAGNOSIS — M95.4 CHEST WALL DEFORMITY: ICD-10-CM

## 2025-01-15 ENCOUNTER — TELEPHONE (OUTPATIENT)
Dept: PLASTIC SURGERY | Facility: CLINIC | Age: 25
End: 2025-01-15
Payer: COMMERCIAL

## 2025-01-15 NOTE — TELEPHONE ENCOUNTER
[facility] Call Center    Phone Message    May a detailed message be left on voicemail: yes     Reason for Call: Other: Kellee is calling in regards to a referral marked for breast reconstruction. HP TE being sent per guidelines for handling.     Action Taken: Message routed to:  [facility] Plastics    Travel Screening: Not Applicable     Date of Service:

## 2025-01-15 NOTE — TELEPHONE ENCOUNTER
Called pt today to discuss referral to plastic surgery, Dr Graham. Pt did not answer but I was able to leave a VM with my direct call back number to discuss. Harmeet WHITLEY RN

## 2025-01-16 NOTE — TELEPHONE ENCOUNTER
FUTURE VISIT INFORMATION      FUTURE VISIT INFORMATION:  Date: 2/18/25  Time: 8:30am  Location: [facility]  REFERRAL INFORMATION:  Referring provider:  Tahira Henderson APRN CNS   Referring providers clinic:  [facility]  Reason for visit/diagnosis  Recent pectus excavatum with hardware removal, chest wall deformatry. Discuss breast augmentation     RECORDS REQUESTED FROM:       Clinic name Comments Records Status Imaging Status   [facility] Ov/referral 1/8/25 epic    Imaging XR chest 1/8/25  CT Chest 12/22/23 epic pac

## 2025-02-18 ENCOUNTER — PRE VISIT (OUTPATIENT)
Dept: PLASTIC SURGERY | Facility: CLINIC | Age: 25
End: 2025-02-18
Payer: COMMERCIAL

## 2025-02-18 NOTE — TELEPHONE ENCOUNTER
FUTURE VISIT INFORMATION      FUTURE VISIT INFORMATION:  Date: 4/22/25  Time: 8:00am  Location: [facility]  REFERRAL INFORMATION:  Referring provider:  Tahira Henderson APRN CNS   Referring providers clinic:  [facility]  Reason for visit/diagnosis  Recent pectus excavatum with hardware removal, chest wall deformatry. Discuss breast augmentation      RECORDS REQUESTED FROM:         Clinic name Comments Records Status Imaging Status   [facility] Ov/referral 1/8/25 epic     Imaging XR chest 1/8/25  CT Chest 12/22/23 epic pac

## 2025-03-21 NOTE — PROGRESS NOTES
"Post Op Check    04/24/2023    Kellee Turk is a 23 year old female with h/o severe pectus excavatum now POD#0 s/p modified Ravitch repair with sternal plating and retrosternal bar placement.    Pt reports moderate amount of pain. Denies SOB, chest pain, or dizziness. Has not gotten out of bed. Denies nausea/vomiting. Tolerating clears.    /57   Pulse 87   Temp 97.7  F (36.5  C) (Oral)   Resp 14   Ht 1.676 m (5' 6\")   Wt 39.6 kg (87 lb 4.8 oz)   LMP 04/17/2023   SpO2 93%   BMI 14.09 kg/m      Gen: A&O x3, NAD  Chest: breathing non-labored, CT to WS   Abdomen: soft, non-tender, non-distended  Incision: clean, dry, intact  Extremities: warm and well perfused    A/P: No acute post-op issues. Continue plan of care per primary team. Please call with any questions.    Collin Braun MD  Surgery resident      " Transportation service

## 2025-04-22 ENCOUNTER — OFFICE VISIT (OUTPATIENT)
Dept: PLASTIC SURGERY | Facility: CLINIC | Age: 25
End: 2025-04-22
Attending: PLASTIC SURGERY
Payer: COMMERCIAL

## 2025-04-22 ENCOUNTER — PRE VISIT (OUTPATIENT)
Dept: PLASTIC SURGERY | Facility: CLINIC | Age: 25
End: 2025-04-22
Payer: COMMERCIAL

## 2025-04-22 VITALS
HEART RATE: 79 BPM | WEIGHT: 85 LBS | RESPIRATION RATE: 16 BRPM | DIASTOLIC BLOOD PRESSURE: 82 MMHG | OXYGEN SATURATION: 98 % | SYSTOLIC BLOOD PRESSURE: 114 MMHG | TEMPERATURE: 97.6 F | BODY MASS INDEX: 13.66 KG/M2 | HEIGHT: 66 IN

## 2025-04-22 DIAGNOSIS — Q67.6 PECTUS EXCAVATUM: Primary | ICD-10-CM

## 2025-04-22 PROCEDURE — 99213 OFFICE O/P EST LOW 20 MIN: CPT | Performed by: PLASTIC SURGERY

## 2025-04-22 RX ORDER — CEFAZOLIN SODIUM 2 G/50ML
2 SOLUTION INTRAVENOUS SEE ADMIN INSTRUCTIONS
OUTPATIENT
Start: 2025-04-22

## 2025-04-22 RX ORDER — CEFAZOLIN SODIUM 2 G/50ML
2 SOLUTION INTRAVENOUS
OUTPATIENT
Start: 2025-04-22

## 2025-04-22 ASSESSMENT — PAIN SCALES - GENERAL: PAINLEVEL_OUTOF10: NO PAIN (0)

## 2025-04-22 NOTE — NURSING NOTE
"Oncology Rooming Note    April 22, 2025 8:06 AM   Kellee Turk is a 25 year old female who presents for:    Chief Complaint   Patient presents with    Oncology Clinic Visit     Recent pectus excavatum with hardware removal, chest wall deformatry     Initial Vitals: /82 (BP Location: Right arm, Patient Position: Sitting, Cuff Size: Adult Regular)   Pulse 79   Temp 97.6  F (36.4  C) (Oral)   Resp 16   Ht 1.676 m (5' 6\")   Wt 38.6 kg (85 lb)   SpO2 98%   BMI 13.72 kg/m   Estimated body mass index is 13.72 kg/m  as calculated from the following:    Height as of this encounter: 1.676 m (5' 6\").    Weight as of this encounter: 38.6 kg (85 lb). Body surface area is 1.34 meters squared.  No Pain (0) Comment: Data Unavailable   No LMP recorded.  Allergies reviewed: Yes  Medications reviewed: Yes    Medications: Medication refills not needed today.  Pharmacy name entered into [email or web address]:    [id number] [place] [facility] [address] [facility][id number] [address] AT [facility]  [address]    Frailty Screening:   Is the patient here for a new oncology consult visit in cancer care? 1. Yes. Over the past month, have you experienced difficulty or required a caregiver to assist with:   1. Balance, walking or general mobility (including any falls)? NO  2. Completion of self-care tasks such as bathing, dressing, toileting, grooming/hygiene?  NO  3. Concentration or memory that affects your daily life?  NO       Clinical concerns: Pt reports no new concerns today.       Stephanie Woodall, EMT     "

## 2025-04-22 NOTE — LETTER
2025      Kellee Turk  [address] [facility] [id number]      Dear Colleague,    Thank you for referring your patient, Kellee Turk, to the [facility]. Please see a copy of my visit note below.    Referring Provider:  MARISSA Matthews CNS  [address] [place] [address]     Primary Care Provider:  Kelsey Giordano      RE: Kellee Turk.  : 2000.  FRANNY: 2025.    Reason for visit: Chest and breast asymmetry s/p pectus excavatum treatment    HPI: The patient was born with pectus excavatum.  In 2023 the patient had a Ravitch procedure done and her bowel was removed and 2024.  This functionally improved her cardiopulmonary status.  However the patient still has some asymmetries that bother her and she is here to discuss that with us.  Because of her thoracic abnormality, her breasts are asymmetric and hypoplastic and underdeveloped.  There are also asymmetries in the chest wall.  She is here to discuss improvements that can be made.  The patient currently is in A cup or smaller and would like to be a full B cup.    Medical history:  Past Medical History:   Diagnosis Date     Pectus excavatum      Underweight        Surgical history:  Past Surgical History:   Procedure Laterality Date     DENTAL SURGERY      wisdom teeth     REMOVE HARDWARE PECTUS RAVITCH N/A 2024    Procedure: REMOVAL, HARDWARE, AFTER PECTUS EXCAVATUM REPAIR;  Surgeon: Bay Nevarez MD;  Location: [facility] OR     REPAIR PECTUS EXCAVATUM N/A 2023    Procedure: REPAIR, PECTUS EXCAVATUM, sternal plating, cryoablation intercostal nerve.;  Surgeon: Bay Nevarez MD;  Location: [facility] OR       Family history:  Family History   Problem Relation Age of Onset     Arrhythmia Mother         A fib, tachycardia     Factor V Leiden deficiency Mother      Gastrointestinal Disease Father         irritable bowel  "disease?     Cancer Maternal Grandmother      Dementia Paternal Grandmother      Anesthesia Reaction No family hx of      Deep Vein Thrombosis (DVT) No family hx of        Medications:  Current Outpatient Medications   Medication Sig Dispense Refill     Multiple Vitamin (MULTIVITAMIN OR) Take by mouth daily.       acetaminophen (TYLENOL) 500 MG tablet Take 1-2 tablets (500-1,000 mg) by mouth every 6 hours as needed for mild pain. 100 tablet 1     ibuprofen (ADVIL/MOTRIN) 600 MG tablet Take 1 tablet (600 mg) by mouth every 6 hours as needed for moderate pain. 30 tablet 0       Allergies:  Allergies   Allergen Reactions     Amoxicillin Shortness Of Breath and Rash     Watermelon Flavoring Agent (Non-Screening)        Social history:   Social History     Tobacco Use     Smoking status: Never     Passive exposure: Never     Smokeless tobacco: Never   Substance Use Topics     Alcohol use: No     Alcohol/week: 0.0 standard drinks of alcohol         Physical Examination:  /82 (BP Location: Right arm, Patient Position: Sitting, Cuff Size: Adult Regular)   Pulse 79   Temp 97.6  F (36.4  C) (Oral)   Resp 16   Ht 1.676 m (5' 6\")   Wt 38.6 kg (85 lb)   SpO2 98%   BMI 13.72 kg/m    Body mass index is 13.72 kg/m .    General: No acute distress.    Chest: The patient has underdeveloped breasts.  The nipple areolar complexes are in the same plane.  The right breast is narrower than the left and smaller in volume.  The right chest is more indented than the left side.  Overall the chest diameter is narrower on the right compared to the left.  She has no upper extremity deficits.  She has a pectoralis major muscle.  She has previous scars from her thoracic surgery within the inframammary fold.  The nipple to inframammary fold distances are short bilaterally.        ASSESMENT and PLAN:     Based upon the above findings, a diagnosis of breast and chest wall asymmetry s/p pectus excavatum repair was made.  I had a jairo, " detailed discussion with the patient, in the presence of my nurse, who was present from beginning to end.  I discussed with the patient the pathophysiology behind the problem, the concept behind the procedure/treatment proposed, expectations of the planned procedure(s), and all sonali-operative steps.     I discussed with the patient and her mother in detail the complexity of the situation and the asymmetries at hand.  From a breast standpoint we could attempt a breast augmentation with breast implants to try and feminizing her breasts more and try and improve some of the asymmetries.  We discussed the difficulties in getting an implant that would be symmetric on both sides given the differences of her base diameter and the asymmetries of her underlying chest wall.  The requirement of a expander was also discussed in some situations.  Despite breast augmentation chances of asymmetries are high given her underlying chest wall asymmetries.    We will attempt prior authorization.  We will send her quotes as well.    Long-term issues with implants were also discussed.  The requirement of exchanges in the future were discussed.    The specific nature of implants, the fact that they need to be replaced in time, that they have potential specific complications of implant rupture, contracture, rippling, firmness, palpability, visibility, rippling, and the association with ASHUTOSH-ALCL/SCC were all discussed. Reviewed the FDA checklist for implant related risks.  Reiterated importance of implant surveillance, which includes MR/High Resolution US imaging study 5-6 years after the original operation, followed by MR/High Resolution US imaging every 3 years thereafter.     All risks, benefits and alternatives, including but not limited to (what applies), pain, infection, bleeding, scarring, asymmetry, seromas, hematomas, wound breakdown, wound dehiscence, loss of the implants/flaps, abdominal wall-healing issues, abdominal wall  weakness, bulges, hernias, sensation loss, requirement of further staged procedures, Implant specific issues and complications as discussed above, removal of infected or exposed implants, pneumothoraces, contour abnormalities, cannula injuries to deeper structures, hernias, fat necrosis, lumps and bumps, loss of grafted material, DVT, PE, MI, CVA, pneumonia, renal failure and death, were explained. They were understood and agreed upon by the patient, they were acknowledged by the patient, all the patient's questions were answered in detail to the patient's fullest understanding that they acknowledged, the team approach for treatment in the operating room was agreed upon by the patient, and proceeding with surgery was agreed upon by the patient.    After our discussion, the patient is inclined towards breast augmentation.    All questions were answered. The patient was happy with the visit. I look forward to helping the patient out in the near future as indicated.       Total time spent in the encounter today including chart review, visit itself, and post-visit paperwork was 60 minutes.       Kieran Graham MD    Chief, Division of Plastic Surgery  Department of Surgery  [facility]      CC: MARISSA Matthews [facility]  [address]  [place] [address]  CC: Kelsey Giordano      Again, thank you for allowing me to participate in the care of your patient.        Sincerely,        MEREDITH Graham MD    Electronically signed

## 2025-04-22 NOTE — PROGRESS NOTES
Referring Provider:  MARISSA Matthews CNS  [address] [place] [address]     Primary Care Provider:  Kelsey Giordano      RE: Kellee Turk.  : 2000.  FRANNY: 2025.    Reason for visit: Chest and breast asymmetry s/p pectus excavatum treatment    HPI: The patient was born with pectus excavatum.  In 2023 the patient had a Ravitch procedure done and her bowel was removed and 2024.  This functionally improved her cardiopulmonary status.  However the patient still has some asymmetries that bother her and she is here to discuss that with us.  Because of her thoracic abnormality, her breasts are asymmetric and hypoplastic and underdeveloped.  There are also asymmetries in the chest wall.  She is here to discuss improvements that can be made.  The patient currently is in A cup or smaller and would like to be a full B cup.    Medical history:  Past Medical History:   Diagnosis Date    Pectus excavatum     Underweight        Surgical history:  Past Surgical History:   Procedure Laterality Date    DENTAL SURGERY      wisdom teeth    REMOVE HARDWARE PECTUS RAVITCH N/A 2024    Procedure: REMOVAL, HARDWARE, AFTER PECTUS EXCAVATUM REPAIR;  Surgeon: Bay Nevarez MD;  Location: [facility] OR    REPAIR PECTUS EXCAVATUM N/A 2023    Procedure: REPAIR, PECTUS EXCAVATUM, sternal plating, cryoablation intercostal nerve.;  Surgeon: Bay Nevarez MD;  Location: [facility] OR       Family history:  Family History   Problem Relation Age of Onset    Arrhythmia Mother         A fib, tachycardia    Factor V Leiden deficiency Mother     Gastrointestinal Disease Father         irritable bowel disease?    Cancer Maternal Grandmother     Dementia Paternal Grandmother     Anesthesia Reaction No family hx of     Deep Vein Thrombosis (DVT) No family hx of        Medications:  Current Outpatient Medications   Medication Sig Dispense Refill    Multiple Vitamin  "(MULTIVITAMIN OR) Take by mouth daily.      acetaminophen (TYLENOL) 500 MG tablet Take 1-2 tablets (500-1,000 mg) by mouth every 6 hours as needed for mild pain. 100 tablet 1    ibuprofen (ADVIL/MOTRIN) 600 MG tablet Take 1 tablet (600 mg) by mouth every 6 hours as needed for moderate pain. 30 tablet 0       Allergies:  Allergies   Allergen Reactions    Amoxicillin Shortness Of Breath and Rash    Watermelon Flavoring Agent (Non-Screening)        Social history:   Social History     Tobacco Use    Smoking status: Never     Passive exposure: Never    Smokeless tobacco: Never   Substance Use Topics    Alcohol use: No     Alcohol/week: 0.0 standard drinks of alcohol         Physical Examination:  /82 (BP Location: Right arm, Patient Position: Sitting, Cuff Size: Adult Regular)   Pulse 79   Temp 97.6  F (36.4  C) (Oral)   Resp 16   Ht 1.676 m (5' 6\")   Wt 38.6 kg (85 lb)   SpO2 98%   BMI 13.72 kg/m    Body mass index is 13.72 kg/m .    General: No acute distress.    Chest: The patient has underdeveloped breasts.  The nipple areolar complexes are in the same plane.  The right breast is narrower than the left and smaller in volume.  The right chest is more indented than the left side.  Overall the chest diameter is narrower on the right compared to the left.  She has no upper extremity deficits.  She has a pectoralis major muscle.  She has previous scars from her thoracic surgery within the inframammary fold.  The nipple to inframammary fold distances are short bilaterally.        ASSESMENT and PLAN:     Based upon the above findings, a diagnosis of breast and chest wall asymmetry s/p pectus excavatum repair was made.  I had a jairo, detailed discussion with the patient, in the presence of my nurse, who was present from beginning to end.  I discussed with the patient the pathophysiology behind the problem, the concept behind the procedure/treatment proposed, expectations of the planned procedure(s), and all " sonali-operative steps.     I discussed with the patient and her mother in detail the complexity of the situation and the asymmetries at hand.  From a breast standpoint we could attempt a breast augmentation with breast implants to try and feminizing her breasts more and try and improve some of the asymmetries.  We discussed the difficulties in getting an implant that would be symmetric on both sides given the differences of her base diameter and the asymmetries of her underlying chest wall.  The requirement of a expander was also discussed in some situations.  Despite breast augmentation chances of asymmetries are high given her underlying chest wall asymmetries.    We will attempt prior authorization.  We will send her quotes as well.    Long-term issues with implants were also discussed.  The requirement of exchanges in the future were discussed.    The specific nature of implants, the fact that they need to be replaced in time, that they have potential specific complications of implant rupture, contracture, rippling, firmness, palpability, visibility, rippling, and the association with ASHUTOSH-ALCL/SCC were all discussed. Reviewed the FDA checklist for implant related risks.  Reiterated importance of implant surveillance, which includes MR/High Resolution US imaging study 5-6 years after the original operation, followed by MR/High Resolution US imaging every 3 years thereafter.     All risks, benefits and alternatives, including but not limited to (what applies), pain, infection, bleeding, scarring, asymmetry, seromas, hematomas, wound breakdown, wound dehiscence, loss of the implants/flaps, abdominal wall-healing issues, abdominal wall weakness, bulges, hernias, sensation loss, requirement of further staged procedures, Implant specific issues and complications as discussed above, removal of infected or exposed implants, pneumothoraces, contour abnormalities, cannula injuries to deeper structures, hernias, fat  necrosis, lumps and bumps, loss of grafted material, DVT, PE, MI, CVA, pneumonia, renal failure and death, were explained. They were understood and agreed upon by the patient, they were acknowledged by the patient, all the patient's questions were answered in detail to the patient's fullest understanding that they acknowledged, the team approach for treatment in the operating room was agreed upon by the patient, and proceeding with surgery was agreed upon by the patient.    After our discussion, the patient is inclined towards breast augmentation.    All questions were answered. The patient was happy with the visit. I look forward to helping the patient out in the near future as indicated.       Total time spent in the encounter today including chart review, visit itself, and post-visit paperwork was 60 minutes.       Kieran Graham MD    Chief, Division of Plastic Surgery  Department of Surgery  [facility]      CC: MARISSA Matthews [facility]  [address]  [place] [address]  CC: Kelsey Giordano

## 2025-05-05 ENCOUNTER — TELEPHONE (OUTPATIENT)
Dept: PLASTIC SURGERY | Facility: CLINIC | Age: 25
End: 2025-05-05
Payer: COMMERCIAL

## 2025-05-05 NOTE — TELEPHONE ENCOUNTER
DEB asking for a call back to schedule with Dr. Graham. Provided [phone] as call back number. Daija Lazar on 5/5/2025 at 3:47 PM

## 2025-05-06 NOTE — TELEPHONE ENCOUNTER
Received voicemail from patient regarding scheduling surgery     Patient can be reached at [phone]    Elizabeth Santos on 5/6/2025 at 12:52 PM

## 2025-05-07 NOTE — TELEPHONE ENCOUNTER
Called to schedule surgery with: Dr. Graham    Spoke with: Kellee     Date of Surgery: 10/15/2025    Estimated Arrival time Discussed with Patient:  No    Location of surgery: [facility]    Pre-operative H&P: patient confirmed they will schedule with PCP - Dr. Nanette Hunter - within thirty days of surgery date.    Pre-surgical Consult: 10/7 at 8:30 AM    Additional Appointments: N/A, no additional visits requested    Post-operative Appointment w/GERMÁN or Surgeon: Dr. Graham 10/21 at 10:00 AM    Discussed with patient pre-op RN will call 2-3 days prior to surgery with arrival time and instructions:  Yes     Standard Surgery Packet: Sending via [email or web address] on 5/7      Additional Comments: None       All patients questions were answered and was instructed to contact the clinic with any questions or concerns.     Ammy Ward on 5/7/2025 at 2:28 PM

## (undated) DEVICE — SU DERMABOND ADVANCED .7ML DNX12

## (undated) DEVICE — SU VICRYL+ 3-0 27IN SH UND VCP416H

## (undated) DEVICE — LINEN TOWEL PACK X6 WHITE 5487

## (undated) DEVICE — PROTECTOR ARM ONE-STEP TRENDELENBURG 40418

## (undated) DEVICE — ESU PENCIL SMOKE EVAC W/ROCKER SWITCH 0703-047-000

## (undated) DEVICE — SOL NACL 0.9% IRRIG 1000ML BOTTLE 2F7124

## (undated) DEVICE — SOL WATER IRRIG 1000ML BOTTLE 2F7114

## (undated) DEVICE — SU SILK 0 SH 30" K834H

## (undated) DEVICE — SU VICRYL 2-0 SH 27" UND J417H

## (undated) DEVICE — SU SILK 0 TIE 6X30" A306H

## (undated) DEVICE — DRSG DRAIN 2X2" 7087

## (undated) DEVICE — DRSG STERI STRIP 1/2X4" R1547

## (undated) DEVICE — SU VICRYL 3-0 SH 27" UND J416H

## (undated) DEVICE — BATERY MAXDRIVE

## (undated) DEVICE — DRAPE IOBAN INCISE 23X17" 6650EZ

## (undated) DEVICE — PITCHER STERILE 1000ML  SSK9004A

## (undated) DEVICE — DRAIN CHEST TUBE RIGHT ANGLED 28FR 8128

## (undated) DEVICE — GLOVE BIOGEL PI MICRO SZ 7.5 48575

## (undated) DEVICE — SUCTION MANIFOLD NEPTUNE 2 SYS 4 PORT 0702-020-000

## (undated) DEVICE — LINEN TOWEL PACK X5 5464

## (undated) DEVICE — SU PDS II 0 CTX 36" Z370T

## (undated) DEVICE — Device

## (undated) DEVICE — SU VICRYL 0 CTX 36" J370H

## (undated) DEVICE — SU VICRYL 4-0 PS-2 18" UND J496H

## (undated) DEVICE — ESU ELEC BLADE E-SEP INSULATED NEPTUNE 70MM 0703-070-002

## (undated) DEVICE — TRAY PNEUMOTHORAX G12032 C-UTPTY-1400-WAYNE-112497

## (undated) DEVICE — DRAPE SPLIT SHEET 77X108 REINFORCED 29436

## (undated) DEVICE — SUCTION TIP YANKAUER STR K87

## (undated) DEVICE — SU SILK 0 TIE 18" SA66H

## (undated) DEVICE — DRIVER HIGH TORQUE STERNALOCK 70-2001

## (undated) DEVICE — ESU ELEC BLADE 2.75" COATED/INSULATED E1455

## (undated) DEVICE — DRAPE U SPLIT 74X120" 29440

## (undated) DEVICE — SU VICRYL 2-0 CT-1 27" UND J259H

## (undated) DEVICE — CATH PROBE CRYOABLATION MALL FLEX 8MM BALL 180DEG CRYOS

## (undated) DEVICE — DRAPE SHEET REV FOLD 3/4 9349

## (undated) DEVICE — SOL ADH LIQUID BENZOIN SWAB 0.6ML C1544

## (undated) DEVICE — BLADE SAW OSCILLATING STRK 53X32X0.38MM 5400-134-282

## (undated) DEVICE — DRSG PRIMAPORE 04 3/4X13 3/4" 66007140

## (undated) DEVICE — BASIN SET SINGLE STERILE 13752-624

## (undated) DEVICE — SPONGE SURGIFOAM 100 1974

## (undated) DEVICE — DRAIN JACKSON PRATT RESERVOIR 100ML SU130-1305

## (undated) DEVICE — ADH LIQUID MASTISOL TOPICAL VIAL 2-3ML 0523-48

## (undated) DEVICE — SPONGE LAP 18X18" X8435

## (undated) DEVICE — DRSG PRIMAPORE 02X3" 7133

## (undated) DEVICE — GLOVE LINER HALF FINGER NYLON KP5015/50

## (undated) DEVICE — SU VICRYL 4-0 SH-1 27" J315H

## (undated) DEVICE — GLOVE BIOGEL PI MICRO INDICATOR UNDERGLOVE SZ 8.0 48980

## (undated) DEVICE — DRAIN CHEST TUBE 32FR STR 8032

## (undated) DEVICE — SU UMBILICAL TAPE .125X30" U11T

## (undated) DEVICE — BLADE SAW OFFSET FAN STRK 30X30X0.38MM 5400-134-279

## (undated) DEVICE — PREP CHLORAPREP 26ML TINTED HI-LITE ORANGE 930815

## (undated) RX ORDER — FENTANYL CITRATE 50 UG/ML
INJECTION, SOLUTION INTRAMUSCULAR; INTRAVENOUS
Status: DISPENSED
Start: 2024-12-09

## (undated) RX ORDER — EPHEDRINE SULFATE 50 MG/ML
INJECTION, SOLUTION INTRAMUSCULAR; INTRAVENOUS; SUBCUTANEOUS
Status: DISPENSED
Start: 2024-12-09

## (undated) RX ORDER — FENTANYL CITRATE 50 UG/ML
INJECTION, SOLUTION INTRAMUSCULAR; INTRAVENOUS
Status: DISPENSED
Start: 2023-04-24

## (undated) RX ORDER — FENTANYL CITRATE-0.9 % NACL/PF 10 MCG/ML
PLASTIC BAG, INJECTION (ML) INTRAVENOUS
Status: DISPENSED
Start: 2023-04-24

## (undated) RX ORDER — METHOCARBAMOL 500 MG/1
TABLET, FILM COATED ORAL
Status: DISPENSED
Start: 2023-04-24

## (undated) RX ORDER — PROPOFOL 10 MG/ML
INJECTION, EMULSION INTRAVENOUS
Status: DISPENSED
Start: 2023-04-24

## (undated) RX ORDER — HYDROMORPHONE HCL IN WATER/PF 6 MG/30 ML
PATIENT CONTROLLED ANALGESIA SYRINGE INTRAVENOUS
Status: DISPENSED
Start: 2023-04-24

## (undated) RX ORDER — PROPOFOL 10 MG/ML
INJECTION, EMULSION INTRAVENOUS
Status: DISPENSED
Start: 2024-12-09

## (undated) RX ORDER — ONDANSETRON 2 MG/ML
INJECTION INTRAMUSCULAR; INTRAVENOUS
Status: DISPENSED
Start: 2023-04-24

## (undated) RX ORDER — HYDROMORPHONE HYDROCHLORIDE 1 MG/ML
INJECTION, SOLUTION INTRAMUSCULAR; INTRAVENOUS; SUBCUTANEOUS
Status: DISPENSED
Start: 2023-04-24

## (undated) RX ORDER — ACETAMINOPHEN 325 MG/1
TABLET ORAL
Status: DISPENSED
Start: 2023-04-24

## (undated) RX ORDER — CLINDAMYCIN PHOSPHATE 900 MG/50ML
INJECTION, SOLUTION INTRAVENOUS
Status: DISPENSED
Start: 2024-12-09

## (undated) RX ORDER — BUPIVACAINE HYDROCHLORIDE 2.5 MG/ML
INJECTION, SOLUTION EPIDURAL; INFILTRATION; INTRACAUDAL
Status: DISPENSED
Start: 2024-12-09

## (undated) RX ORDER — CLINDAMYCIN PHOSPHATE 900 MG/50ML
INJECTION, SOLUTION INTRAVENOUS
Status: DISPENSED
Start: 2023-04-24

## (undated) RX ORDER — ONDANSETRON 2 MG/ML
INJECTION INTRAMUSCULAR; INTRAVENOUS
Status: DISPENSED
Start: 2024-12-09

## (undated) RX ORDER — ACETAMINOPHEN 325 MG/1
TABLET ORAL
Status: DISPENSED
Start: 2024-12-09

## (undated) RX ORDER — LIDOCAINE HYDROCHLORIDE 10 MG/ML
INJECTION, SOLUTION EPIDURAL; INFILTRATION; INTRACAUDAL; PERINEURAL
Status: DISPENSED
Start: 2024-12-09

## (undated) RX ORDER — DEXAMETHASONE SODIUM PHOSPHATE 4 MG/ML
INJECTION, SOLUTION INTRA-ARTICULAR; INTRALESIONAL; INTRAMUSCULAR; INTRAVENOUS; SOFT TISSUE
Status: DISPENSED
Start: 2023-04-24

## (undated) RX ORDER — GLYCOPYRROLATE 0.2 MG/ML
INJECTION, SOLUTION INTRAMUSCULAR; INTRAVENOUS
Status: DISPENSED
Start: 2023-04-24